# Patient Record
Sex: FEMALE | Race: WHITE | NOT HISPANIC OR LATINO
[De-identification: names, ages, dates, MRNs, and addresses within clinical notes are randomized per-mention and may not be internally consistent; named-entity substitution may affect disease eponyms.]

---

## 2018-08-24 ENCOUNTER — RESULT REVIEW (OUTPATIENT)
Age: 73
End: 2018-08-24

## 2019-06-06 PROBLEM — Z00.00 ENCOUNTER FOR PREVENTIVE HEALTH EXAMINATION: Status: ACTIVE | Noted: 2019-06-06

## 2019-09-04 ENCOUNTER — RECORD ABSTRACTING (OUTPATIENT)
Age: 74
End: 2019-09-04

## 2019-09-04 DIAGNOSIS — R42 DIZZINESS AND GIDDINESS: ICD-10-CM

## 2019-09-04 DIAGNOSIS — H04.123 DRY EYE SYNDROME OF BILATERAL LACRIMAL GLANDS: ICD-10-CM

## 2019-09-04 DIAGNOSIS — Z87.39 PERSONAL HISTORY OF OTHER DISEASES OF THE MUSCULOSKELETAL SYSTEM AND CONNECTIVE TISSUE: ICD-10-CM

## 2019-09-04 DIAGNOSIS — L72.9 FOLLICULAR CYST OF THE SKIN AND SUBCUTANEOUS TISSUE, UNSPECIFIED: ICD-10-CM

## 2019-09-04 LAB — CYTOLOGY CVX/VAG DOC THIN PREP: NORMAL

## 2019-09-12 ENCOUNTER — APPOINTMENT (OUTPATIENT)
Dept: OBGYN | Facility: CLINIC | Age: 74
End: 2019-09-12
Payer: MEDICARE

## 2019-09-12 VITALS
WEIGHT: 147 LBS | SYSTOLIC BLOOD PRESSURE: 128 MMHG | BODY MASS INDEX: 27.05 KG/M2 | HEIGHT: 62 IN | DIASTOLIC BLOOD PRESSURE: 72 MMHG

## 2019-09-12 DIAGNOSIS — Z92.89 PERSONAL HISTORY OF OTHER MEDICAL TREATMENT: ICD-10-CM

## 2019-09-12 DIAGNOSIS — Z87.898 PERSONAL HISTORY OF OTHER SPECIFIED CONDITIONS: ICD-10-CM

## 2019-09-12 DIAGNOSIS — R42 DIZZINESS AND GIDDINESS: ICD-10-CM

## 2019-09-12 DIAGNOSIS — Z78.9 OTHER SPECIFIED HEALTH STATUS: ICD-10-CM

## 2019-09-12 DIAGNOSIS — Z12.11 ENCOUNTER FOR SCREENING FOR MALIGNANT NEOPLASM OF COLON: ICD-10-CM

## 2019-09-12 DIAGNOSIS — N95.2 POSTMENOPAUSAL ATROPHIC VAGINITIS: ICD-10-CM

## 2019-09-12 DIAGNOSIS — K21.0 GASTRO-ESOPHAGEAL REFLUX DISEASE WITH ESOPHAGITIS: ICD-10-CM

## 2019-09-12 DIAGNOSIS — E06.3 AUTOIMMUNE THYROIDITIS: ICD-10-CM

## 2019-09-12 LAB
DATE COLLECTED: NORMAL
HEMOCCULT SP1 STL QL: NEGATIVE
QUALITY CONTROL: YES

## 2019-09-12 PROCEDURE — G0101: CPT

## 2019-09-12 PROCEDURE — 82270 OCCULT BLOOD FECES: CPT

## 2019-09-12 NOTE — PHYSICAL EXAM
[Awake] : awake [Alert] : alert [Examination Of The Breasts] : a normal appearance [No Discharge] : no discharge [No Masses] : no breast masses were palpable [Soft] : soft [Oriented x3] : oriented to person, place, and time [Labia Majora] : labia major [Labia Minora] : labia minora [Normal] : clitoris [Atrophy] : atrophy [No Bleeding] : there was no active vaginal bleeding [Dry Mucosa] : dry mucosa [Uterine Adnexae] : were not tender and not enlarged [No Tenderness] : no rectal tenderness [Nl Sphincter Tone] : normal sphincter tone [Acute Distress] : no acute distress [Mass] : no breast mass [Nipple Discharge] : no nipple discharge [Axillary LAD] : no axillary lymphadenopathy [Tender] : non tender [Distended] : not distended [H/Smegaly] : no hepatosplenomegaly [Depressed Mood] : not depressed [Flat Affect] : affect not flat [Occult Blood] : occult blood test from digital rectal exam was negative

## 2019-09-12 NOTE — END OF VISIT
[FreeTextEntry3] : I, Brian Luz, acted solely as a scribe for Dr. Koehler on this date 09/12/2019.\par All medical record entries made by the Scribe were at my, Dr. Koehler’s direction and personally dictated by me on  09/12/2019. I have reviewed the chart and agree that the record accurately reflects my personal performance of the history, physical exam, assessment and plan. I have also personally directed, reviewed, and agreed with the chart.\par \par  - Continue statin

## 2019-09-12 NOTE — HISTORY OF PRESENT ILLNESS
[1 Year Ago] : 1 year ago [Good] : being in good health [Healthy Diet] : a healthy diet [Regular Exercise] : regular exercise [Last Mammogram ___] : Last Mammogram was [unfilled] [Last Bone Density ___] : Last bone density studies [unfilled] [Last Pap ___] : Last cervical pap smear was [unfilled] [Last Colonoscopy ___] : Last colonoscopy [unfilled] [Postmenopausal] : is postmenopausal [Pregnancy History] : pregnancy history: [Total Preg ___] : [unfilled] [Full Term ___] : [unfilled] [Living ___] : [unfilled] [Monogamous (Male Partner)] : is monogamous with a male partner [Menarche Age: ____] : age at menarche was [unfilled] [Definite:  ___ (Date)] : the last menstrual period was [unfilled] [Sexually Active] : is sexually active [Male ___] : [unfilled] male [Monogamous] : is monogamous [Weight Concerns] : no concerns with her weight [de-identified] : p u/s 10/06/2014

## 2020-06-22 ENCOUNTER — RX RENEWAL (OUTPATIENT)
Age: 75
End: 2020-06-22

## 2020-10-14 ENCOUNTER — APPOINTMENT (OUTPATIENT)
Dept: OBGYN | Facility: CLINIC | Age: 75
End: 2020-10-14
Payer: MEDICARE

## 2020-10-14 VITALS
DIASTOLIC BLOOD PRESSURE: 60 MMHG | WEIGHT: 132 LBS | TEMPERATURE: 97.8 F | SYSTOLIC BLOOD PRESSURE: 120 MMHG | BODY MASS INDEX: 25.91 KG/M2 | HEIGHT: 60 IN

## 2020-10-14 DIAGNOSIS — Z78.0 ASYMPTOMATIC MENOPAUSAL STATE: ICD-10-CM

## 2020-10-14 DIAGNOSIS — Z12.39 ENCOUNTER FOR OTHER SCREENING FOR MALIGNANT NEOPLASM OF BREAST: ICD-10-CM

## 2020-10-14 DIAGNOSIS — Z01.419 ENCOUNTER FOR GYNECOLOGICAL EXAMINATION (GENERAL) (ROUTINE) W/OUT ABNORMAL FINDINGS: ICD-10-CM

## 2020-10-14 PROCEDURE — 99214 OFFICE O/P EST MOD 30 MIN: CPT

## 2020-10-14 RX ORDER — CYCLOSPORINE 0.5 MG/ML
0.05 EMULSION OPHTHALMIC
Refills: 0 | Status: ACTIVE | COMMUNITY

## 2020-10-14 RX ORDER — QUINIDINE GLUCONATE 324 MG
TABLET, EXTENDED RELEASE ORAL
Refills: 0 | Status: ACTIVE | COMMUNITY

## 2020-10-14 RX ORDER — COLD-HOT PACK
EACH MISCELLANEOUS
Refills: 0 | Status: ACTIVE | COMMUNITY

## 2020-10-14 RX ORDER — OMEGA-3/DHA/EPA/FISH OIL 300-1000MG
CAPSULE ORAL
Refills: 0 | Status: ACTIVE | COMMUNITY

## 2020-10-14 RX ORDER — ESTRADIOL 2 MG/1
2 RING VAGINAL
Qty: 1 | Refills: 3 | Status: ACTIVE | COMMUNITY
Start: 2019-09-12 | End: 1900-01-01

## 2020-10-14 RX ORDER — VITAMIN B COMPLEX
CAPSULE ORAL
Refills: 0 | Status: ACTIVE | COMMUNITY

## 2020-10-14 RX ORDER — OMEPRAZOLE 20 MG/1
20 TABLET, DELAYED RELEASE ORAL
Refills: 0 | Status: ACTIVE | COMMUNITY

## 2020-10-14 RX ORDER — PSYLLIUM HUSK 0.4 G
CAPSULE ORAL
Refills: 0 | Status: ACTIVE | COMMUNITY

## 2020-10-14 RX ORDER — ESTRADIOL 2 MG/1
2 RING VAGINAL
Refills: 0 | Status: ACTIVE | COMMUNITY

## 2020-10-14 RX ORDER — LEVOTHYROXINE SODIUM 50 UG/1
50 CAPSULE ORAL
Refills: 0 | Status: ACTIVE | COMMUNITY

## 2020-10-14 RX ORDER — CHROMIUM 200 MCG
TABLET ORAL
Refills: 0 | Status: ACTIVE | COMMUNITY

## 2020-10-14 RX ORDER — CICLESONIDE 50 UG/1
50 SPRAY NASAL
Refills: 0 | Status: ACTIVE | COMMUNITY

## 2020-10-14 NOTE — DISCUSSION/SUMMARY
[FreeTextEntry1] : 76 y/o\par gyn annual\par -ASCCP guidelines recommend cessation of pap smears\par -MMG script\par -refill [facility]\par rto 1 yr gyn annual or prn

## 2020-10-14 NOTE — HISTORY OF PRESENT ILLNESS
[Patient reported mammogram was normal] : Patient reported mammogram was normal [Patient reported PAP Smear was normal] : Patient reported PAP Smear was normal [LMP unknown] : LMP unknown [postmenopausal] : postmenopausal [Y] : Positive pregnancy history [unknown] : Patient is unsure of the date of her LMP [Menarche Age: ____] : age at menarche was [unfilled] [Currently Active] : currently active [Men] : men [Vaginal] : vaginal [No] : No [N] : Patient is not sexually active [FreeTextEntry1] : 76 y/o gyn annual.\par c/o of chronic urinary urgency in the morning only. denies dysuria/frequency or leakage of urine.\par denies hx of abnormal pap over the last 20 years.\par using [facility] would like a refill [Mammogramdate] : 05/10/19 [TextBox_19] : B1 [PapSmeardate] : 08/24/18 [ColonoscopyDate] : 06/17 [TextBox_31] : neg [[facility] 2 [PGHxTotal] : 2

## 2020-10-14 NOTE — PHYSICAL EXAM
[Alert] : alert [Appropriately responsive] : appropriately responsive [No Lymphadenopathy] : no lymphadenopathy [No Acute Distress] : no acute distress [No Murmurs] : no murmurs [Soft] : soft [Non-distended] : non-distended [Non-tender] : non-tender [No HSM] : No HSM [No Lesions] : no lesions [No Mass] : no mass [Examination Of The Breasts] : a normal appearance [Oriented x3] : oriented x3 [No Masses] : no breast masses were palpable [Labia Minora] : normal [Labia Majora] : normal [Atrophy] : atrophy [Normal] : normal [Uterine Adnexae] : normal

## 2020-12-23 PROBLEM — Z01.419 ENCOUNTER FOR ANNUAL ROUTINE GYNECOLOGICAL EXAMINATION: Status: RESOLVED | Noted: 2019-09-12 | Resolved: 2020-12-23

## 2021-03-15 ENCOUNTER — APPOINTMENT (OUTPATIENT)
Dept: ORTHOPEDIC SURGERY | Facility: CLINIC | Age: 76
End: 2021-03-15
Payer: MEDICARE

## 2021-03-15 VITALS — WEIGHT: 130 LBS | HEIGHT: 62 IN | BODY MASS INDEX: 23.92 KG/M2

## 2021-03-15 PROCEDURE — 73552 X-RAY EXAM OF FEMUR 2/>: CPT | Mod: RT

## 2021-03-15 PROCEDURE — 99203 OFFICE O/P NEW LOW 30 MIN: CPT

## 2021-03-15 RX ORDER — CHOLECALCIFEROL (VITAMIN D3) 25 MCG
TABLET ORAL
Refills: 0 | Status: ACTIVE | COMMUNITY

## 2021-03-15 RX ORDER — BIOTIN 5 MG
CAPSULE ORAL
Refills: 0 | Status: ACTIVE | COMMUNITY

## 2021-03-15 RX ORDER — OMEPRAZOLE 20 MG/1
20 CAPSULE, DELAYED RELEASE ORAL
Refills: 0 | Status: ACTIVE | COMMUNITY

## 2021-03-15 RX ORDER — UBIDECARENONE 200 MG
CAPSULE ORAL
Refills: 0 | Status: ACTIVE | COMMUNITY

## 2021-03-15 RX ORDER — DOCUSATE SODIUM 100 MG
100 TABLET ORAL
Refills: 0 | Status: ACTIVE | COMMUNITY

## 2021-03-15 RX ORDER — AMOXICILLIN 875 MG/1
875 TABLET, FILM COATED ORAL
Qty: 14 | Refills: 0 | Status: ACTIVE | COMMUNITY
Start: 2020-12-31

## 2021-03-15 RX ORDER — BACILLUS COAGULANS/INULIN 1B-250 MG
CAPSULE ORAL
Refills: 0 | Status: ACTIVE | COMMUNITY

## 2021-03-15 RX ORDER — TURMERIC ROOT EXTRACT 500 MG
TABLET ORAL
Refills: 0 | Status: ACTIVE | COMMUNITY

## 2021-03-15 RX ORDER — NAPROXEN 500 MG/1
500 TABLET ORAL
Qty: 60 | Refills: 0 | Status: ACTIVE | COMMUNITY
Start: 2021-02-15

## 2021-03-15 RX ORDER — TIZANIDINE 4 MG/1
4 TABLET ORAL
Qty: 90 | Refills: 0 | Status: ACTIVE | COMMUNITY
Start: 2021-02-15

## 2021-03-15 RX ORDER — METHYLPREDNISOLONE 4 MG/1
4 TABLET ORAL
Qty: 21 | Refills: 0 | Status: ACTIVE | COMMUNITY
Start: 2021-02-08

## 2021-03-15 RX ORDER — PRAMOXINE HYDROCHLORIDE HYDROCORTISONE ACETATE 100; 100 MG/10G; MG/10G
1-1 AEROSOL, FOAM TOPICAL
Qty: 10 | Refills: 0 | Status: ACTIVE | COMMUNITY
Start: 2020-09-24

## 2021-03-15 RX ORDER — CALCIUM CARBONATE/VITAMIN D3 600 MG-10
TABLET ORAL
Refills: 0 | Status: ACTIVE | COMMUNITY

## 2021-03-15 RX ORDER — ACETAMINOPHEN AND CODEINE 300; 30 MG/1; MG/1
300-30 TABLET ORAL
Qty: 42 | Refills: 0 | Status: ACTIVE | COMMUNITY
Start: 2021-03-07

## 2021-03-15 RX ORDER — UBIQUINOL 100 MG
CAPSULE ORAL
Refills: 0 | Status: ACTIVE | COMMUNITY

## 2021-03-15 NOTE — PHYSICAL EXAM
[de-identified] : Right Lower Extremity\par o Hip :\par ¦ Inspection/Palpation : no tenderness, no swelling, no deformities\par ¦ Range of Motion : : limited and painful in all planes,, no crepitus\par ¦ Stability : joint stability intact\par ¦ Strength : not assessed today due to fracture \par ¦ Tests and Signs : all tests for stability normal\par \par o Knee :\par ¦ Inspection/Palpation : no tenderness to palpation, no swelling, no deformity, incisions clean, dry and intact, nonerythematous, no discharge or dehiscence. \par ¦ Range of Motion : limited and painful in all planes, no crepitus\par ¦ Stability : no valgus or varus instability present on provocative testing, Lachman’s Test (-)\par ¦ Strength : not assessed today due to fracture \par o Muscle Bulk : normal muscle bulk present\par o Skin : no erythema, no ecchymosis\par o Sensation : sensation to pin intact\par o Vascular Exam : no edema, no cyanosis, dorsalis pedis artery pulse 2+, posterior tibial artery pulse 2+\par  [de-identified] : o Right Femur:  AP and lateral views were obtained, there are no soft tissue abnormalities, no fractures, alignment is normal, normal bone density, normal appearing joint spaces, no bony lesions, s/p intramedullary nailing and placement of a small medial plate on a distal femur fracture, hardware in good positioning and proper alignment. \par \par

## 2021-03-15 NOTE — HISTORY OF PRESENT ILLNESS
[de-identified] : SUSY MOSS is a 76 year female presenting to the office complaining of right knee pain. She presents to the office ambulating with a wheelchair. Patient sustained a fall while in [place] on 03/03/2021. She went to the hospital in [place] where she was diagnosed with a distal femur fracture. Patient underwent right distal femur fracture by Dr. Koo.  Patient’s incision was closed with subcuticular sutures and a derma bond. Patient reports mild diffuse pain since surgery. She has remained non-weight bearing since the procedure.  Patients physicians recommended follow up blood work, due to being on Lovenox and mildly abnormal blood count after surgery. Patient is taking Tylenol for pain relief with moderate relief in symptoms. Patient denies any other complaints at this time.

## 2021-03-15 NOTE — DISCUSSION/SUMMARY
[de-identified] : The underlying pathophysiology was reviewed in great detail with the patient as well as the various treatment options, including ice, analgesics, NSAIDs, Physical therapy, steroid injections.\par \par a prescription for lab work was provided today.\par \par Activity modifications and restrictions were discussed. remain PWB at this time \par \par FU 4 weeks for repeat xrays. \par \par All questions were answered, all alternatives discussed and the patient is in complete agreement with that plan. Follow-up appointment as instructed. Any issues and the patient will call or come in sooner.

## 2021-04-08 ENCOUNTER — APPOINTMENT (OUTPATIENT)
Dept: ORTHOPEDIC SURGERY | Facility: CLINIC | Age: 76
End: 2021-04-08
Payer: MEDICARE

## 2021-04-08 DIAGNOSIS — M72.2 PLANTAR FASCIAL FIBROMATOSIS: ICD-10-CM

## 2021-04-08 PROCEDURE — 99214 OFFICE O/P EST MOD 30 MIN: CPT

## 2021-04-08 PROCEDURE — 73552 X-RAY EXAM OF FEMUR 2/>: CPT | Mod: RT

## 2021-04-08 NOTE — HISTORY OF PRESENT ILLNESS
[de-identified] : SUSY MOSS is a 76 year female presenting to the office complaining of right knee pain. She presents to the office ambulating with a wheelchair. Patient sustained a fall while in [place] on 03/03/2021. She went to the hospital in [place] where she was diagnosed with a distal femur fracture. Patient underwent right distal femur fracture by Dr. Koo.  Patient’s incision was closed with subcuticular sutures and a derma bond. Patient reports mild diffuse pain since surgery. She has remained non-weight bearing since the procedure.  Patients physicians recommended follow up blood work, due to being on Lovenox and mildly abnormal blood count after surgery. Most recent CBC results were sent to her PCP, A prescription for Physical Therapy was provided.  Patient is taking Tylenol for pain relief with moderate relief in symptoms. \par Patient notes new onset of bilateral feet pain. Patient notes pain started in the left foot and is not located in the right foot. She notes increased pain with stepping off and the pain is worse in the morning. Patient denies any injury or trauma to the area. \par Patient denies any other complaints at this time.  \par

## 2021-04-08 NOTE — DISCUSSION/SUMMARY
[de-identified] : The underlying pathophysiology was reviewed in great detail with the patient as well as the various treatment options, including ice, analgesics, NSAIDs, Physical therapy, steroid injections.\par \par a prescription for lab work was provided today.\par \par A prescription for Physical Therapy was provided.\par \par Activity modifications and restrictions were discussed. remain PWB at this time \par \par FU 4 weeks for repeat xrays. \par \par All questions were answered, all alternatives discussed and the patient is in complete agreement with that plan. Follow-up appointment as instructed. Any issues and the patient will call or come in sooner.

## 2021-04-08 NOTE — PHYSICAL EXAM
[de-identified] : Right Lower Extremity\par o Hip :\par ¦ Inspection/Palpation : no tenderness, no swelling, no deformities\par ¦ Range of Motion : : limited and painful in all planes,, no crepitus\par ¦ Stability : joint stability intact\par ¦ Strength : not assessed today due to fracture \par ¦ Tests and Signs : all tests for stability normal\par \par o Knee :\par ¦ Inspection/Palpation : no tenderness to palpation, no swelling, no deformity, incisions clean, dry and intact, nonerythematous, no discharge or dehiscence. \par ¦ Range of Motion : 0-100, no crepitus\par ¦ Stability : no valgus or varus instability present on provocative testing, Lachman’s Test (-)\par ¦ Strength : not assessed today due to fracture \par o Muscle Bulk : normal muscle bulk present\par o Skin : no erythema, no ecchymosis\par o Sensation : sensation to pin intact\par o Vascular Exam : no edema, no cyanosis, dorsalis pedis artery pulse 2+, posterior tibial artery pulse 2+\par \par \par Right Lower Extremity\par o Foot:\par ¦ Inspection/Palpation : plantar fascia tenderness to palpation, no swelling\par ¦ Range of Motion : arc of motion full and painless in all planes\par ¦ Stability : no joint instability on provocative testing\par ¦ Strength : all muscles 5/5\par o Muscle Bulk : no atrophy\par o Sensation : sensation intact to light touch\par o Skin : no skin lesions, mild erythema over plantar medial aspect of the hind foot\par o Vascular Exam : no edema, no cyanosis, dorsalis pedis and posterior tibial pulses normal \par \par Left Lower Extremity\par o Foot:\par ¦ Inspection/Palpation : minimal plantar fascia tenderness to palpation, no swelling\par ¦ Range of Motion : arc of motion full and painless in all planes\par ¦ Stability : no joint instability on provocative testing\par ¦ Strength : all muscles 5/5\par o Muscle Bulk : no atrophy\par o Sensation : sensation intact to light touch\par o Skin : no skin lesions, no discoloration\par o Vascular Exam : no edema, no cyanosis, dorsalis pedis and posterior tibial pulses normal \par  [de-identified] : o Right Femur:  AP and lateral views were obtained, there are no soft tissue abnormalities, no fractures, alignment is normal, normal bone density, normal appearing joint spaces, no bony lesions, s/p intramedullary nailing and placement of a small medial plate on a distal femur fracture, hardware in good positioning and proper alignment. mild callus formation present. \par \par

## 2021-04-22 RX ORDER — OMEPRAZOLE 20 MG/1
20 CAPSULE, DELAYED RELEASE ORAL
Qty: 90 | Refills: 0 | Status: DISCONTINUED | COMMUNITY
Start: 2020-12-23 | End: 2021-04-22

## 2021-04-22 RX ORDER — ZINC SULFATE 50(220)MG
CAPSULE ORAL
Refills: 0 | Status: DISCONTINUED | COMMUNITY
End: 2021-04-22

## 2021-04-22 RX ORDER — OMEGA-3/DHA/EPA/FISH OIL 300-1000MG
CAPSULE ORAL
Refills: 0 | Status: DISCONTINUED | COMMUNITY
End: 2021-04-22

## 2021-04-22 RX ORDER — ENOXAPARIN SODIUM 100 MG/ML
40 INJECTION SUBCUTANEOUS
Qty: 8 | Refills: 0 | Status: DISCONTINUED | COMMUNITY
Start: 2021-03-07 | End: 2021-04-22

## 2021-04-22 RX ORDER — UBIDECARENONE/VIT E ACET 100MG-5
CAPSULE ORAL
Refills: 0 | Status: DISCONTINUED | COMMUNITY
End: 2021-04-22

## 2021-04-23 ENCOUNTER — OUTPATIENT (OUTPATIENT)
Dept: OUTPATIENT SERVICES | Facility: HOSPITAL | Age: 76
LOS: 1 days | End: 2021-04-23
Payer: MEDICARE

## 2021-04-23 ENCOUNTER — APPOINTMENT (OUTPATIENT)
Dept: ULTRASOUND IMAGING | Facility: CLINIC | Age: 76
End: 2021-04-23
Payer: MEDICARE

## 2021-04-23 DIAGNOSIS — S72.491A OTHER FRACTURE OF LOWER END OF RIGHT FEMUR, INITIAL ENCOUNTER FOR CLOSED FRACTURE: ICD-10-CM

## 2021-04-23 PROCEDURE — 93971 EXTREMITY STUDY: CPT | Mod: 26,RT

## 2021-04-23 PROCEDURE — 93971 EXTREMITY STUDY: CPT

## 2021-05-10 ENCOUNTER — APPOINTMENT (OUTPATIENT)
Dept: ORTHOPEDIC SURGERY | Facility: CLINIC | Age: 76
End: 2021-05-10
Payer: MEDICARE

## 2021-05-10 ENCOUNTER — NON-APPOINTMENT (OUTPATIENT)
Age: 76
End: 2021-05-10

## 2021-05-10 LAB
BASOPHILS # BLD AUTO: 0.02 K/UL
BASOPHILS NFR BLD AUTO: 0.4 %
EOSINOPHIL # BLD AUTO: 0.09 K/UL
EOSINOPHIL NFR BLD AUTO: 1.6 %
HCT VFR BLD CALC: 39.3 %
HGB BLD-MCNC: 12.5 G/DL
IMM GRANULOCYTES NFR BLD AUTO: 0.5 %
LYMPHOCYTES # BLD AUTO: 0.88 K/UL
LYMPHOCYTES NFR BLD AUTO: 15.5 %
MAN DIFF?: NORMAL
MCHC RBC-ENTMCNC: 29.3 PG
MCHC RBC-ENTMCNC: 31.8 GM/DL
MCV RBC AUTO: 92 FL
MONOCYTES # BLD AUTO: 0.46 K/UL
MONOCYTES NFR BLD AUTO: 8.1 %
NEUTROPHILS # BLD AUTO: 4.19 K/UL
NEUTROPHILS NFR BLD AUTO: 73.9 %
PLATELET # BLD AUTO: 250 K/UL
RBC # BLD: 4.27 M/UL
RBC # FLD: 12.9 %
WBC # FLD AUTO: 5.67 K/UL

## 2021-05-10 PROCEDURE — 99213 OFFICE O/P EST LOW 20 MIN: CPT

## 2021-05-10 PROCEDURE — 73552 X-RAY EXAM OF FEMUR 2/>: CPT | Mod: RT

## 2021-05-10 NOTE — DISCUSSION/SUMMARY
[de-identified] : The underlying pathophysiology was reviewed in great detail with the patient as well as the various treatment options, including ice, analgesics, NSAIDs, Physical therapy, steroid injections.\par \par \par Activity modifications and restrictions were discussed. \par A prescription for Physical Therapy was provided. Patient may progress to 50% WBAT. \par \par Complete CBC blood work. Will send lab results to PCP. \par \par FU 4 weeks for repeat xrays. \par \par All questions were answered, all alternatives discussed and the patient is in complete agreement with that plan. Follow-up appointment as instructed. Any issues and the patient will call or come in sooner.

## 2021-05-10 NOTE — PHYSICAL EXAM
[de-identified] : Right Lower Extremity\par o Hip :\par ¦ Inspection/Palpation : no tenderness, no swelling, no deformities\par ¦ Range of Motion : : limited and painful in all planes,, no crepitus\par ¦ Stability : joint stability intact\par ¦ Strength : not assessed today due to fracture \par ¦ Tests and Signs : all tests for stability normal\par \par o Knee :\par ¦ Inspection/Palpation : no tenderness to palpation, no swelling, no deformity, incisions clean, dry and intact, nonerythematous, no discharge or dehiscence. \par ¦ Range of Motion : 0-100, no crepitus\par ¦ Stability : no valgus or varus instability present on provocative testing, Lachman’s Test (-)\par ¦ Strength : not assessed today due to fracture \par o Muscle Bulk : normal muscle bulk present\par o Skin : no erythema, no ecchymosis\par o Sensation : sensation to pin intact\par o Vascular Exam : no edema, no cyanosis, dorsalis pedis artery pulse 2+, posterior tibial artery pulse 2+\par \par \par Right Lower Extremity\par o Foot:\par ¦ Inspection/Palpation : plantar fascia tenderness to palpation, no swelling\par ¦ Range of Motion : arc of motion full and painless in all planes\par ¦ Stability : no joint instability on provocative testing\par ¦ Strength : all muscles 5/5\par o Muscle Bulk : no atrophy\par o Sensation : sensation intact to light touch\par o Skin : no skin lesions, mild erythema over plantar medial aspect of the hind foot\par o Vascular Exam : no edema, no cyanosis, dorsalis pedis and posterior tibial pulses normal \par \par Left Lower Extremity\par o Foot:\par ¦ Inspection/Palpation : minimal plantar fascia tenderness to palpation, no swelling\par ¦ Range of Motion : arc of motion full and painless in all planes\par ¦ Stability : no joint instability on provocative testing\par ¦ Strength : all muscles 5/5\par o Muscle Bulk : no atrophy\par o Sensation : sensation intact to light touch\par o Skin : no skin lesions, no discoloration\par o Vascular Exam : no edema, no cyanosis, dorsalis pedis and posterior tibial pulses normal \par  [de-identified] : o Right Femur:  AP and lateral views were obtained, there are no soft tissue abnormalities, no fractures, alignment is normal, normal bone density, normal appearing joint spaces, no bony lesions, s/p intramedullary nailing and placement of a small medial plate on a distal femur fracture, hardware in good positioning and proper alignment. mild callus formation present. \par \par

## 2021-05-10 NOTE — HISTORY OF PRESENT ILLNESS
[de-identified] : SUSY MOSS is a 76 year female presenting to the office complaining of right knee pain. She presents to the office ambulating with a wheelchair. Patient sustained a fall while in [place] on 03/03/2021. She went to the hospital in [place] where she was diagnosed with a distal femur fracture. Patient underwent right distal femur fracture by Dr. Koo.  Patient’s incision was closed with subcuticular sutures and a derma bond. Patient reports mild diffuse pain since surgery. She has remained non-weight bearing since the procedure.  Patients physicians recommended follow up blood work, due to being on Lovenox and mildly abnormal blood count after surgery. Most recent CBC results were sent to her PCP, A prescription for Physical Therapy was provided.  Patient is taking Tylenol for pain relief with moderate relief in symptoms. \par Patient notes new onset of bilateral feet pain. Patient notes pain started in the left foot and is not located in the right foot. She notes increased pain with stepping off and the pain is worse in the morning.  She called last week with lower leg swelling. She was sent for a duplex US that was negative for clots. She was referred to a vascular physician for further evaluation. Patient denies any injury or trauma to the area.  \par Patient denies any other complaints at this time.  \par

## 2021-06-07 ENCOUNTER — APPOINTMENT (OUTPATIENT)
Dept: ORTHOPEDIC SURGERY | Facility: CLINIC | Age: 76
End: 2021-06-07
Payer: MEDICARE

## 2021-06-07 PROCEDURE — 73552 X-RAY EXAM OF FEMUR 2/>: CPT | Mod: RT

## 2021-06-07 PROCEDURE — 99213 OFFICE O/P EST LOW 20 MIN: CPT

## 2021-06-08 NOTE — HISTORY OF PRESENT ILLNESS
[de-identified] : SUSY MOSS is a 76 year female presenting to the office complaining of right knee pain. She presents to the office ambulating with a wheelchair. Patient sustained a fall while in [place] on 03/03/2021. She went to the hospital in [place] where she was diagnosed with a distal femur fracture. Patient underwent right distal femur fracture by Dr. Koo.  Patient’s incision was closed with subcuticular sutures and a derma bond. Patient reports mild diffuse pain since surgery. She has remained non-weight bearing since the procedure.  Patients physicians recommended follow up blood work, due to being on Lovenox and mildly abnormal blood count after surgery. Most recent CBC results were sent to her PCP, A prescription for Physical Therapy was provided.  Patient is taking Tylenol for pain relief with moderate relief in symptoms. \par Patient notes new onset of bilateral feet pain. Patient notes pain started in the left foot and is not located in the right foot. She notes increased pain with stepping off and the pain is worse in the morning.  She called last week with lower leg swelling. She was sent for a duplex US that was negative for clots. She was referred to a vascular physician for further evaluation. Patient denies any injury or trauma to the area.  \par Patient denies any other complaints at this time.  \par

## 2021-06-08 NOTE — PHYSICAL EXAM
[de-identified] : Right Lower Extremity\par o Hip :\par ¦ Inspection/Palpation : no tenderness, no swelling, no deformities\par ¦ Range of Motion : : limited and painful in all planes,, no crepitus\par ¦ Stability : joint stability intact\par ¦ Strength : not assessed today due to fracture \par ¦ Tests and Signs : all tests for stability normal\par \par o Knee :\par ¦ Inspection/Palpation : no tenderness to palpation, no swelling, no deformity, incisions clean, dry and intact, nonerythematous, no discharge or dehiscence. \par ¦ Range of Motion : 0-100, no crepitus\par ¦ Stability : no valgus or varus instability present on provocative testing, Lachman’s Test (-)\par ¦ Strength : not assessed today due to fracture \par o Muscle Bulk : normal muscle bulk present\par o Skin : no erythema, no ecchymosis\par o Sensation : sensation to pin intact\par o Vascular Exam : no edema, no cyanosis, dorsalis pedis artery pulse 2+, posterior tibial artery pulse 2+\par \par \par Right Lower Extremity\par o Foot:\par ¦ Inspection/Palpation : plantar fascia tenderness to palpation, no swelling\par ¦ Range of Motion : arc of motion full and painless in all planes\par ¦ Stability : no joint instability on provocative testing\par ¦ Strength : all muscles 5/5\par o Muscle Bulk : no atrophy\par o Sensation : sensation intact to light touch\par o Skin : no skin lesions, mild erythema over plantar medial aspect of the hind foot\par o Vascular Exam : no edema, no cyanosis, dorsalis pedis and posterior tibial pulses normal \par \par Left Lower Extremity\par o Foot:\par ¦ Inspection/Palpation : minimal plantar fascia tenderness to palpation, no swelling\par ¦ Range of Motion : arc of motion full and painless in all planes\par ¦ Stability : no joint instability on provocative testing\par ¦ Strength : all muscles 5/5\par o Muscle Bulk : no atrophy\par o Sensation : sensation intact to light touch\par o Skin : no skin lesions, no discoloration\par o Vascular Exam : no edema, no cyanosis, dorsalis pedis and posterior tibial pulses normal \par  [de-identified] : o Right Femur:  AP and lateral views were obtained, there are no soft tissue abnormalities, no fractures, alignment is normal, normal bone density, normal appearing joint spaces, no bony lesions, s/p intramedullary nailing and placement of a small medial plate on a distal femur fracture, hardware in good positioning and proper alignment. mild callus formation present. \par \par

## 2021-07-12 ENCOUNTER — APPOINTMENT (OUTPATIENT)
Dept: ORTHOPEDIC SURGERY | Facility: CLINIC | Age: 76
End: 2021-07-12
Payer: MEDICARE

## 2021-07-12 PROCEDURE — 99213 OFFICE O/P EST LOW 20 MIN: CPT

## 2021-07-12 PROCEDURE — 73552 X-RAY EXAM OF FEMUR 2/>: CPT | Mod: RT

## 2021-07-12 NOTE — REASON FOR VISIT
[Other: ____] : [unfilled] [Follow-Up Visit] : a follow-up visit for [Aftercare Following Surgery] : aftercare following surgery

## 2021-07-13 NOTE — HISTORY OF PRESENT ILLNESS
[de-identified] : SUSY MOSS is a 76 year female presenting to the office complaining of right knee pain. She presents to the office ambulating with a cane today Patient sustained a fall while in [place] on 03/03/2021. She went to the hospital in [place] where she was diagnosed with a distal femur fracture. Patient underwent right distal femur fracture by Dr. Koo.  Patient’s incision was closed with subcuticular sutures and a derma bond. \par Since last visit, patient reports feeling better overall. She is able to ambulate with a cane with minimal difficulties. She  has been attending physical therapy noting improvements in strength and range of motion. She notes some intermittent pain and soreness in the hip that is increased with prolonged walking, standing or with stairs.  Patient is taking Tylenol for pain relief with moderate relief in symptoms. \par Patient notes new onset of bilateral feet pain. Patient notes pain started in the left foot and is not located in the right foot. She notes increased pain with stepping off and the pain is worse in the morning.  She called last week with lower leg swelling. She was sent for a duplex US that was negative for clots. She was referred to a vascular physician for further evaluation. Patient denies any injury or trauma to the area.  \par Patient denies any other complaints at this time.  \par

## 2021-07-13 NOTE — PHYSICAL EXAM
[de-identified] : Right Lower Extremity\par o Hip :\par ¦ Inspection/Palpation : no tenderness, no swelling, no deformities\par ¦ Range of Motion : : mildly restricted and painfree in all planes, no crepitus\par ¦ Stability : joint stability intact\par ¦ Strength : not assessed today due to fracture \par ¦ Tests and Signs : all tests for stability normal\par \par o Knee :\par ¦ Inspection/Palpation : no tenderness to palpation, no swelling, no deformity, incision well healed. \par ¦ Range of Motion : 0-110, no crepitus\par ¦ Stability : no valgus or varus instability present on provocative testing, Lachman’s Test (-)\par ¦ Strength : not assessed today due to fracture \par o Muscle Bulk : normal muscle bulk present\par o Skin : no erythema, no ecchymosis\par o Sensation : sensation to pin intact\par o Vascular Exam : no edema, no cyanosis, dorsalis pedis artery pulse 2+, posterior tibial artery pulse 2+\par \par \par Right Lower Extremity\par o Foot:\par ¦ Inspection/Palpation : mild plantar fascia tenderness to palpation, no swelling\par ¦ Range of Motion : arc of motion full and painless in all planes\par ¦ Stability : no joint instability on provocative testing\par ¦ Strength : all muscles 5/5\par o Muscle Bulk : no atrophy\par o Sensation : sensation intact to light touch\par o Skin : no skin lesions, mild erythema over plantar medial aspect of the hind foot\par o Vascular Exam : no edema, no cyanosis, dorsalis pedis and posterior tibial pulses normal \par \par Left Lower Extremity\par o Foot:\par ¦ Inspection/Palpation : minimal plantar fascia tenderness to palpation, no swelling\par ¦ Range of Motion : arc of motion full and painless in all planes\par ¦ Stability : no joint instability on provocative testing\par ¦ Strength : all muscles 5/5\par o Muscle Bulk : no atrophy\par o Sensation : sensation intact to light touch\par o Skin : no skin lesions, no discoloration\par o Vascular Exam : no edema, no cyanosis, dorsalis pedis and posterior tibial pulses normal \par  [de-identified] : o Right Femur:  AP and lateral views were obtained, there are no soft tissue abnormalities, no fractures, alignment is normal, normal bone density, normal appearing joint spaces, no bony lesions, s/p intramedullary nailing and placement of a small medial plate on a distal femur fracture, hardware in good positioning and proper alignment. increased callus formation present. \par \par

## 2021-07-13 NOTE — DISCUSSION/SUMMARY
[de-identified] : The underlying pathophysiology was reviewed in great detail with the patient as well as the various treatment options, including ice, analgesics, NSAIDs, Physical therapy, steroid injections.\par \par Activity modifications and restrictions were discussed. \par \par Continue physical therapy as prescribed. A prescription for Physical Therapy was provided. \par \par FU 8 weeks for repeat xrays. \par \par All questions were answered, all alternatives discussed and the patient is in complete agreement with that plan. Follow-up appointment as instructed. Any issues and the patient will call or come in sooner.

## 2021-09-13 ENCOUNTER — APPOINTMENT (OUTPATIENT)
Dept: ORTHOPEDIC SURGERY | Facility: CLINIC | Age: 76
End: 2021-09-13
Payer: MEDICARE

## 2021-09-13 DIAGNOSIS — S72.491G: ICD-10-CM

## 2021-09-13 PROCEDURE — 20610 DRAIN/INJ JOINT/BURSA W/O US: CPT | Mod: RT

## 2021-09-13 PROCEDURE — 99213 OFFICE O/P EST LOW 20 MIN: CPT | Mod: 25

## 2021-09-13 PROCEDURE — 73564 X-RAY EXAM KNEE 4 OR MORE: CPT | Mod: RT

## 2021-09-13 PROCEDURE — 73552 X-RAY EXAM OF FEMUR 2/>: CPT | Mod: RT

## 2021-09-13 NOTE — PROCEDURE
[de-identified] : At this point I recommended a therapeutic injection and under sterile precautions an injection of 4 cc 1% lidocaine with 0.5 cc of Kenalog and 0.5 cc of Dexamethasone - was placed into the joint of the RIGHT knee without complication, and after several minutes, the patient felt significant relief.\par \par

## 2021-09-13 NOTE — PHYSICAL EXAM
[de-identified] : Right Lower Extremity\par o Hip :\par ¦ Inspection/Palpation : no tenderness, no swelling, no deformities\par ¦ Range of Motion : : mildly restricted and pain free in all planes, no crepitus\par ¦ Stability : joint stability intact\par ¦ Strength : not assessed today due to fracture \par ¦ Tests and Signs : all tests for stability normal\par \par o Knee :\par ¦ Inspection/Palpation : marked medial joint line tenderness to palpation, no swelling, no deformity, incision well healed. \par ¦ Range of Motion : 0-110, no crepitus\par ¦ Stability : no valgus or varus instability present on provocative testing, Lachman’s Test (-)\par ¦ Strength : 3+/5 hip flexion  \par o Muscle Bulk : normal muscle bulk present\par o Skin : no erythema, no ecchymosis\par o Sensation : sensation to pin intact\par o Vascular Exam : no edema, no cyanosis, dorsalis pedis artery pulse 2+, posterior tibial artery pulse 2+\par  [de-identified] : o Right Femur:  AP and lateral views were obtained, there are no soft tissue abnormalities, no fractures, alignment is normal, normal bone density, , no bony lesions, s/p intramedullary nailing and placement of a small medial plate on a distal femur fracture, hardware in good positioning and proper alignment. increased callus formation present. 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

## 2021-09-13 NOTE — DISCUSSION/SUMMARY
[de-identified] : The underlying pathophysiology was reviewed in great detail with the patient as well as the various treatment options, including ice, analgesics, NSAIDs, Physical therapy, steroid injections.\par \par Patient received a corticosteroid injection of the right knee today. \par \par Activity modifications and restrictions were discussed. \par \par . A prescription for Physical Therapy was provided. \par \par A home exercise sheet was given and discussed with the patient to follow. \par \par FU 8 weeks for repeat xrays. \par \par All questions were answered, all alternatives discussed and the patient is in complete agreement with that plan. Follow-up appointment as instructed. Any issues and the patient will call or come in sooner.

## 2021-09-13 NOTE — HISTORY OF PRESENT ILLNESS
[de-identified] : SUSY MOSS is a 76 year female presenting to the office complaining of right knee pain. She presents to the office ambulating with a cane today Patient sustained a fall while in [place] on 03/03/2021. She went to the hospital in [place] where she was diagnosed with a distal femur fracture. Patient underwent right distal femur fracture by Dr. Koo.  Patient’s incision was closed with subcuticular sutures and a derma bond. \par Since last visit, patient reports regression.  She is able to ambulate with a cane with minimal difficulties. She discontinued formal physcial therapy ion July due to lack of insurance coverage. She was not as diligent with a home exercises program as she should of been. She notes increased knee pain over the past two months. Pain is diffusely located. . She notes some intermittent pain and soreness in the hip that is increased with prolonged walking, standing or with stairs.  Patient is taking Tylenol for pain relief with moderate relief in symptoms. \par Patient denies any other complaints at this time.  \par

## 2021-11-15 ENCOUNTER — APPOINTMENT (OUTPATIENT)
Dept: ORTHOPEDIC SURGERY | Facility: CLINIC | Age: 76
End: 2021-11-15
Payer: MEDICARE

## 2021-11-15 DIAGNOSIS — S72.491D OTHER FRACTURE OF LOWER END OF RIGHT FEMUR, SUBSEQUENT ENCOUNTER FOR CLOSED FRACTURE WITH ROUTINE HEALING: ICD-10-CM

## 2021-11-15 DIAGNOSIS — M17.11 UNILATERAL PRIMARY OSTEOARTHRITIS, RIGHT KNEE: ICD-10-CM

## 2021-11-15 PROCEDURE — 99214 OFFICE O/P EST MOD 30 MIN: CPT | Mod: 25

## 2021-11-15 PROCEDURE — 20610 DRAIN/INJ JOINT/BURSA W/O US: CPT | Mod: RT

## 2021-11-15 PROCEDURE — 73552 X-RAY EXAM OF FEMUR 2/>: CPT | Mod: RT

## 2021-11-15 NOTE — HISTORY OF PRESENT ILLNESS
[de-identified] : SUSY MOSS is a 76 year female presenting to the office complaining of right knee pain. She presents to the office ambulating with a cane today Patient sustained a fall while in [place] on 03/03/2021. She went to the hospital in [place] where she was diagnosed with a distal femur fracture. Patient underwent right distal femur fracture by Dr. Koo.  Patient’s incision was closed with subcuticular sutures and a derma bond. \par Since last visit, patient reports regression.  She is able to ambulate with a cane with minimal difficulties. She discontinued formal Physcial therapy ion July due to lack of insurance coverage. She was not as diligent with a home exercises program as she should of been. She notes increased knee pain over the past two months. Pain is diffusely located. . She notes some intermittent pain and soreness in the hip that is increased with prolonged walking, standing or with stairs.  Patient is taking Tylenol for pain relief with moderate relief in symptoms. Patient denies any other complaints at this time.

## 2021-11-15 NOTE — PROCEDURE
[de-identified] : At this point I recommended a therapeutic injection and under sterile precautions an injection of 4 cc of Monovisc (Lot: [id number] , Expiration: 03/2023 ), was placed into the joint of the RIGHT knee without complication\par \par \par

## 2021-11-15 NOTE — DISCUSSION/SUMMARY
[de-identified] : The underlying pathophysiology was reviewed in great detail with the patient as well as the various treatment options, including ice, analgesics, NSAIDs, Physical therapy, steroid injections.\par \par The patient elected to receive a Monovisc injection into her right  knee today and tolerated it well. I instructed the patient on ROM exercises, and told them to take it easy. The use of ice and rest was reviewed with the patient. The patient may resume activities in a couple of days. I reminded the patient that it takes 4 to 6 weeks after the Monovisc injection to feel symptom relief \par  \par \par Activity modifications and restrictions were discussed. \par \par . A prescription for Physical Therapy was provided. \par \par A home exercise sheet was given and discussed with the patient to follow. \par \par She may return in 6 months for another series of gel injections as per insurance guidelines. A cortisone injection may be administered during the interim period if she cannot tolerate such a wait. \par \par Discussed she may return in January prior to going to [place] for a corticosteroid injection if pain persists. \par \par All questions were answered, all alternatives discussed and the patient is in complete agreement with that plan. Follow-up appointment as instructed. Any issues and the patient will call or come in sooner.

## 2021-11-15 NOTE — PHYSICAL EXAM
[de-identified] : Right Lower Extremity\par o Hip :\par ¦ Inspection/Palpation : no tenderness, no swelling, no deformities\par ¦ Range of Motion : : mildly restricted and pain free in all planes, no crepitus\par ¦ Stability : joint stability intact\par ¦ Strength : not assessed today due to fracture \par ¦ Tests and Signs : all tests for stability normal\par \par o Knee :\par ¦ Inspection/Palpation : mild medial joint line tenderness to palpation, no swelling, no deformity, incision well healed. \par ¦ Range of Motion : 0-110, no crepitus\par ¦ Stability : no valgus or varus instability present on provocative testing, Lachman’s Test (-)\par ¦ Strength : 4/5 hip flexion  \par o Muscle Bulk : normal muscle bulk present\par o Skin : no erythema, no ecchymosis\par o Sensation : sensation to pin intact\par o Vascular Exam : no edema, no cyanosis, dorsalis pedis artery pulse 2+, posterior tibial artery pulse 2+\par  [de-identified] : o Right Femur:  AP and lateral views were obtained, there are no soft tissue abnormalities, no fractures, alignment is normal, normal bone density, , no bony lesions, s/p intramedullary nailing and placement of a small medial plate on a distal femur fracture, hardware in good positioning and proper alignment, well healed, moderate to severe tricompartmental osteoarthritis with bone on bone apposition of the medial compartment\par \par

## 2023-04-24 ENCOUNTER — OFFICE (OUTPATIENT)
Dept: URBAN - METROPOLITAN AREA CLINIC 12 | Facility: CLINIC | Age: 78
Setting detail: OPHTHALMOLOGY
End: 2023-04-24
Payer: MEDICARE

## 2023-04-24 DIAGNOSIS — H35.30: ICD-10-CM

## 2023-04-24 DIAGNOSIS — H35.373: ICD-10-CM

## 2023-04-24 DIAGNOSIS — H26.492: ICD-10-CM

## 2023-04-24 DIAGNOSIS — H16.223: ICD-10-CM

## 2023-04-24 DIAGNOSIS — H43.393: ICD-10-CM

## 2023-04-24 PROCEDURE — 92134 CPTRZ OPH DX IMG PST SGM RTA: CPT | Performed by: STUDENT IN AN ORGANIZED HEALTH CARE EDUCATION/TRAINING PROGRAM

## 2023-04-24 PROCEDURE — 68761 CLOSE TEAR DUCT OPENING: CPT | Performed by: STUDENT IN AN ORGANIZED HEALTH CARE EDUCATION/TRAINING PROGRAM

## 2023-04-24 PROCEDURE — 92014 COMPRE OPH EXAM EST PT 1/>: CPT | Performed by: STUDENT IN AN ORGANIZED HEALTH CARE EDUCATION/TRAINING PROGRAM

## 2023-04-24 ASSESSMENT — KERATOMETRY
METHOD_AUTO_MANUAL: AUTO
OD_AXISANGLE_DEGREES: 012
OD_K2POWER_DIOPTERS: 45.50
OD_K1POWER_DIOPTERS: 45.00
OS_K1POWER_DIOPTERS: 45.75
OS_K2POWER_DIOPTERS: 46.75
OS_AXISANGLE_DEGREES: 156

## 2023-04-24 ASSESSMENT — REFRACTION_MANIFEST
OD_SPHERE: +1.25
OD_VA1: 20/20
OS_VA1: 20/25
OS_VA1: 20/25+1
OD_CYLINDER: -1.00
OD_AXIS: 100
OS_AXIS: 085
OS_CYLINDER: -1.50
OD_AXIS: 100
OS_CYLINDER: -1.25
OS_SPHERE: +1.00
OS_AXIS: 80
OD_SPHERE: +1.50
OD_VA1: 20/20-2
OD_CYLINDER: -2.00
OD_ADD: +2.50
OS_ADD: +2.50
OS_SPHERE: +1.00

## 2023-04-24 ASSESSMENT — REFRACTION_AUTOREFRACTION
OD_CYLINDER: -1.25
OS_AXIS: 082
OD_SPHERE: +1.00
OD_AXIS: 098
OS_CYLINDER: -1.00
OS_SPHERE: +0.75

## 2023-04-24 ASSESSMENT — REFRACTION_CURRENTRX
OS_VPRISM_DIRECTION: PROGS
OD_ADD: +2.50
OS_CYLINDER: -1.25
OD_OVR_VA: 20/
OD_AXIS: 102
OS_AXIS: 091
OD_VPRISM_DIRECTION: PROGS
OS_ADD: +2.50
OD_CYLINDER: -1.00
OD_ADD: +2.25
OS_SPHERE: +1.00
OS_CYLINDER: -1.25
OD_SPHERE: +1.25
OD_OVR_VA: 20/
OS_SPHERE: +1.00
OS_VPRISM_DIRECTION: PROGS
OD_SPHERE: +1.25
OS_OVR_VA: 20/
OS_ADD: +2.25
OS_AXIS: 090
OD_AXIS: 101
OD_CYLINDER: -1.50
OS_OVR_VA: 20/
OD_VPRISM_DIRECTION: PROGS

## 2023-04-24 ASSESSMENT — SPHEQUIV_DERIVED
OS_SPHEQUIV: 0.375
OD_SPHEQUIV: 0.5
OD_SPHEQUIV: 0.375
OD_SPHEQUIV: 0.75
OS_SPHEQUIV: 0.25
OS_SPHEQUIV: 0.25

## 2023-04-24 ASSESSMENT — AXIALLENGTH_DERIVED
OS_AL: 22.5341
OD_AL: 22.6926
OS_AL: 22.5341
OD_AL: 22.8285
OS_AL: 22.4897
OD_AL: 22.783

## 2023-04-24 ASSESSMENT — SUPERFICIAL PUNCTATE KERATITIS (SPK)
OS_SPK: 1+
OD_SPK: 1+

## 2023-04-24 ASSESSMENT — PUNCTA - ASSESSMENT
OD_PUNCTA: SIL PLUG
OS_PUNCTA: SIL PLUG

## 2023-04-24 ASSESSMENT — TONOMETRY
OS_IOP_MMHG: 15
OD_IOP_MMHG: 15

## 2023-04-24 ASSESSMENT — CONFRONTATIONAL VISUAL FIELD TEST (CVF)
OD_FINDINGS: FULL
OS_FINDINGS: FULL

## 2023-04-24 ASSESSMENT — VISUAL ACUITY
OD_BCVA: 20/25+2
OS_BCVA: 20/25+2

## 2023-06-06 PROBLEM — H52.03 HYPEROPIA ; BOTH EYES: Status: ACTIVE | Noted: 2023-05-02

## 2023-10-25 ENCOUNTER — OFFICE (OUTPATIENT)
Dept: URBAN - METROPOLITAN AREA CLINIC 12 | Facility: CLINIC | Age: 78
Setting detail: OPHTHALMOLOGY
End: 2023-10-25
Payer: MEDICARE

## 2023-10-25 DIAGNOSIS — H35.3131: ICD-10-CM

## 2023-10-25 DIAGNOSIS — H43.393: ICD-10-CM

## 2023-10-25 DIAGNOSIS — H16.223: ICD-10-CM

## 2023-10-25 DIAGNOSIS — H26.492: ICD-10-CM

## 2023-10-25 PROCEDURE — 92014 COMPRE OPH EXAM EST PT 1/>: CPT | Performed by: OPHTHALMOLOGY

## 2023-10-25 PROCEDURE — 92250 FUNDUS PHOTOGRAPHY W/I&R: CPT | Performed by: OPHTHALMOLOGY

## 2023-10-25 ASSESSMENT — REFRACTION_MANIFEST
OD_AXIS: 095
OS_CYLINDER: -1.25
OS_SPHERE: +0.75
OD_SPHERE: +1.00
OS_AXIS: 080
OD_SPHERE: +1.25
OD_AXIS: 100
OD_VA1: 20/20
OD_CYLINDER: -1.50
OD_VA1: 20/20-
OS_AXIS: 085
OS_CYLINDER: -1.25
OS_ADD: +2.50
OS_SPHERE: +1.00
OS_VA1: 20/20-
OD_CYLINDER: -1.00
OD_ADD: +2.50
OS_VA1: 20/25

## 2023-10-25 ASSESSMENT — REFRACTION_CURRENTRX
OS_CYLINDER: -1.25
OD_ADD: +2.25
OD_AXIS: 101
OS_SPHERE: +1.00
OS_ADD: +2.25
OS_OVR_VA: 20/
OD_CYLINDER: -1.00
OS_VPRISM_DIRECTION: PROGS
OS_ADD: +2.25
OD_VPRISM_DIRECTION: PROGS
OS_AXIS: 090
OD_VPRISM_DIRECTION: PROGS
OD_ADD: +2.25
OD_OVR_VA: 20/
OS_CYLINDER: -1.25
OS_SPHERE: +1.00
OD_SPHERE: +1.25
OD_CYLINDER: -1.50
OD_OVR_VA: 20/
OS_OVR_VA: 20/
OS_AXIS: 091
OS_VPRISM_DIRECTION: PROGS
OD_AXIS: 094
OD_SPHERE: +1.25

## 2023-10-25 ASSESSMENT — CONFRONTATIONAL VISUAL FIELD TEST (CVF)
OD_FINDINGS: FULL
OS_FINDINGS: FULL

## 2023-10-25 ASSESSMENT — KERATOMETRY
METHOD_AUTO_MANUAL: AUTO
OD_K1POWER_DIOPTERS: 44.50
OS_K1POWER_DIOPTERS: 45.75
OD_K2POWER_DIOPTERS: 45.25
OD_AXISANGLE_DEGREES: 009
OS_AXISANGLE_DEGREES: 161
OS_K2POWER_DIOPTERS: 46.00

## 2023-10-25 ASSESSMENT — AXIALLENGTH_DERIVED
OD_AL: 23.0045
OS_AL: 22.7055
OS_AL: 22.7055
OD_AL: 23.0045
OS_AL: 22.6156
OD_AL: 22.8207

## 2023-10-25 ASSESSMENT — REFRACTION_AUTOREFRACTION
OS_CYLINDER: -1.25
OD_CYLINDER: -1.50
OS_AXIS: 080
OS_SPHERE: +0.75
OD_SPHERE: +1.00
OD_AXIS: 094

## 2023-10-25 ASSESSMENT — PUNCTA - ASSESSMENT
OD_PUNCTA: SIL PLUG
OS_PUNCTA: SIL PLUG

## 2023-10-25 ASSESSMENT — SPHEQUIV_DERIVED
OS_SPHEQUIV: 0.125
OD_SPHEQUIV: 0.75
OS_SPHEQUIV: 0.375
OD_SPHEQUIV: 0.25
OD_SPHEQUIV: 0.25
OS_SPHEQUIV: 0.125

## 2023-10-25 ASSESSMENT — TONOMETRY
OS_IOP_MMHG: 15
OD_IOP_MMHG: 14

## 2023-10-25 ASSESSMENT — VISUAL ACUITY
OD_BCVA: 20/25-
OS_BCVA: 20/25

## 2023-10-25 ASSESSMENT — SUPERFICIAL PUNCTATE KERATITIS (SPK)
OD_SPK: 1+
OS_SPK: 1+

## 2023-12-11 ENCOUNTER — OFFICE (OUTPATIENT)
Dept: URBAN - METROPOLITAN AREA CLINIC 100 | Facility: CLINIC | Age: 78
Setting detail: OPHTHALMOLOGY
End: 2023-12-11
Payer: MEDICARE

## 2023-12-11 DIAGNOSIS — H35.3131: ICD-10-CM

## 2023-12-11 DIAGNOSIS — H35.373: ICD-10-CM

## 2023-12-11 DIAGNOSIS — H16.223: ICD-10-CM

## 2023-12-11 DIAGNOSIS — H43.393: ICD-10-CM

## 2023-12-11 DIAGNOSIS — H26.493: ICD-10-CM

## 2023-12-11 DIAGNOSIS — Z96.1: ICD-10-CM

## 2023-12-11 PROCEDURE — 99214 OFFICE O/P EST MOD 30 MIN: CPT | Performed by: OPHTHALMOLOGY

## 2023-12-11 ASSESSMENT — REFRACTION_MANIFEST
OD_CYLINDER: -1.75
OD_SPHERE: +1.25
OS_ADD: +2.50
OD_VA1: 20/20-
OD_AXIS: 095
OS_SPHERE: PLANO
OD_VA1: 20/20-
OS_VA1: 20/30-
OS_VA1: 20/20-
OD_CYLINDER: -1.75
OD_SPHERE: +1.25
OD_ADD: +2.50
OS_SPHERE: PLANO
OD_AXIS: 095
OS_CYLINDER: -0.75
OS_AXIS: 085
OS_CYLINDER: -0.75
OS_AXIS: 085

## 2023-12-11 ASSESSMENT — REFRACTION_CURRENTRX
OD_AXIS: 105
OD_ADD: +2.25
OS_VPRISM_DIRECTION: PROGS
OS_AXIS: 095
OS_SPHERE: +1.00
OD_CYLINDER: -1.00
OD_CYLINDER: -1.50
OD_VPRISM_DIRECTION: PROGS
OD_OVR_VA: 20/
OS_CYLINDER: -1.25
OS_ADD: +2.25
OS_SPHERE: +1.00
OS_CYLINDER: -1.25
OS_OVR_VA: 20/
OS_OVR_VA: 20/
OD_AXIS: 101
OD_OVR_VA: 20/
OD_SPHERE: +1.25
OS_VPRISM_DIRECTION: PROGS
OS_ADD: +2.25
OD_ADD: +2.25
OS_AXIS: 091
OD_SPHERE: +1.25
OD_VPRISM_DIRECTION: PROGS

## 2023-12-11 ASSESSMENT — SUPERFICIAL PUNCTATE KERATITIS (SPK)
OS_SPK: 1+
OD_SPK: 1+

## 2023-12-11 ASSESSMENT — SPHEQUIV_DERIVED
OD_SPHEQUIV: 0.375

## 2023-12-11 ASSESSMENT — PUNCTA - ASSESSMENT
OD_PUNCTA: SIL PLUG
OS_PUNCTA: SIL PLUG

## 2023-12-11 ASSESSMENT — REFRACTION_AUTOREFRACTION
OS_AXIS: 085
OD_AXIS: 095
OD_SPHERE: +1.25
OS_SPHERE: PLANO
OS_CYLINDER: -0.75
OD_CYLINDER: -1.75

## 2023-12-11 ASSESSMENT — CONFRONTATIONAL VISUAL FIELD TEST (CVF)
OD_FINDINGS: FULL
OS_FINDINGS: FULL

## 2023-12-15 ENCOUNTER — ASC (OUTPATIENT)
Dept: URBAN - METROPOLITAN AREA SURGERY 8 | Facility: SURGERY | Age: 78
Setting detail: OPHTHALMOLOGY
End: 2023-12-15
Payer: MEDICARE

## 2023-12-15 ENCOUNTER — RX ONLY (RX ONLY)
Age: 78
End: 2023-12-15

## 2023-12-15 DIAGNOSIS — H26.492: ICD-10-CM

## 2023-12-15 PROCEDURE — 66821 AFTER CATARACT LASER SURGERY: CPT | Mod: LT | Performed by: OPHTHALMOLOGY

## 2023-12-15 ASSESSMENT — REFRACTION_MANIFEST
OS_CYLINDER: -0.75
OS_VA1: 20/20-
OD_AXIS: 095
OD_VA1: 20/20-
OD_AXIS: 095
OD_SPHERE: +1.25
OD_SPHERE: +1.25
OD_VA1: 20/20-
OD_CYLINDER: -1.75
OS_SPHERE: PLANO
OS_SPHERE: PLANO
OD_CYLINDER: -1.75
OD_ADD: +2.50
OS_AXIS: 085
OS_VA1: 20/30-
OS_ADD: +2.50
OS_CYLINDER: -0.75
OS_AXIS: 085

## 2023-12-15 ASSESSMENT — REFRACTION_CURRENTRX
OD_VPRISM_DIRECTION: PROGS
OS_CYLINDER: -1.25
OD_ADD: +2.25
OS_SPHERE: +1.00
OD_OVR_VA: 20/
OD_AXIS: 101
OS_ADD: +2.25
OS_OVR_VA: 20/
OS_CYLINDER: -1.25
OS_AXIS: 095
OS_ADD: +2.25
OD_AXIS: 105
OD_VPRISM_DIRECTION: PROGS
OS_VPRISM_DIRECTION: PROGS
OS_AXIS: 091
OD_SPHERE: +1.25
OD_CYLINDER: -1.00
OD_SPHERE: +1.25
OS_SPHERE: +1.00
OS_OVR_VA: 20/
OD_CYLINDER: -1.50
OS_VPRISM_DIRECTION: PROGS
OD_ADD: +2.25
OD_OVR_VA: 20/

## 2023-12-15 ASSESSMENT — REFRACTION_AUTOREFRACTION
OD_AXIS: 095
OD_CYLINDER: -1.75
OS_SPHERE: PLANO
OS_CYLINDER: -0.75
OD_SPHERE: +1.25
OS_AXIS: 085

## 2023-12-15 ASSESSMENT — SUPERFICIAL PUNCTATE KERATITIS (SPK)
OS_SPK: 1+
OD_SPK: 1+

## 2023-12-15 ASSESSMENT — CONFRONTATIONAL VISUAL FIELD TEST (CVF)
OD_FINDINGS: FULL
OS_FINDINGS: FULL

## 2023-12-15 ASSESSMENT — SPHEQUIV_DERIVED
OD_SPHEQUIV: 0.375

## 2024-01-05 ENCOUNTER — OFFICE (OUTPATIENT)
Dept: URBAN - METROPOLITAN AREA CLINIC 12 | Facility: CLINIC | Age: 79
Setting detail: OPHTHALMOLOGY
End: 2024-01-05
Payer: MEDICARE

## 2024-01-05 DIAGNOSIS — H16.223: ICD-10-CM

## 2024-01-05 DIAGNOSIS — H26.493: ICD-10-CM

## 2024-01-05 PROBLEM — H52.7 REFRACTIVE ERROR: Status: ACTIVE | Noted: 2024-01-05

## 2024-01-05 PROCEDURE — 99024 POSTOP FOLLOW-UP VISIT: CPT | Performed by: OPTOMETRIST

## 2024-01-05 ASSESSMENT — CONFRONTATIONAL VISUAL FIELD TEST (CVF)
OS_FINDINGS: FULL
OD_FINDINGS: FULL

## 2024-01-05 ASSESSMENT — REFRACTION_CURRENTRX
OS_AXIS: 095
OS_ADD: +2.25
OD_SPHERE: +1.25
OD_CYLINDER: -1.00
OS_SPHERE: +1.00
OD_AXIS: 101
OD_VPRISM_DIRECTION: PROGS
OS_VPRISM_DIRECTION: PROGS
OS_OVR_VA: 20/
OD_AXIS: 105
OS_CYLINDER: -1.25
OS_CYLINDER: -1.25
OS_AXIS: 091
OD_ADD: +2.25
OS_ADD: +2.25
OD_OVR_VA: 20/
OD_ADD: +2.25
OS_VPRISM_DIRECTION: PROGS
OS_OVR_VA: 20/
OD_CYLINDER: -1.50
OD_VPRISM_DIRECTION: PROGS
OS_SPHERE: +1.00
OD_SPHERE: +1.25
OD_OVR_VA: 20/

## 2024-01-05 ASSESSMENT — REFRACTION_AUTOREFRACTION
OD_AXIS: 098
OS_CYLINDER: -1.00
OD_CYLINDER: -1.25
OD_SPHERE: +1.00
OS_SPHERE: +0.75
OS_AXIS: 083

## 2024-01-05 ASSESSMENT — REFRACTION_MANIFEST
OS_AXIS: 085
OD_CYLINDER: -1.75
OD_AXIS: 100
OD_ADD: +2.50
OD_AXIS: 095
OD_VA1: 20/20-
OS_ADD: +2.50
OD_CYLINDER: -1.25
OD_SPHERE: +0.75
OD_VA1: 20/20
OS_CYLINDER: -1.00
OS_CYLINDER: -0.75
OS_VA1: 20/30-
OD_ADD: +2.50
OS_AXIS: 085
OS_SPHERE: PLANO
OD_SPHERE: +1.25
OS_VA1: 20/20-
OS_SPHERE: +1.00
OS_ADD: +2.50

## 2024-01-05 ASSESSMENT — SPHEQUIV_DERIVED
OD_SPHEQUIV: 0.375
OS_SPHEQUIV: 0.5
OD_SPHEQUIV: 0.125
OS_SPHEQUIV: 0.25
OD_SPHEQUIV: 0.375

## 2024-01-05 ASSESSMENT — PUNCTA - ASSESSMENT
OS_PUNCTA: SIL PLUG
OD_PUNCTA: SIL PLUG

## 2024-01-05 ASSESSMENT — SUPERFICIAL PUNCTATE KERATITIS (SPK)
OS_SPK: 1+ 2+
OD_SPK: 1+

## 2024-01-16 PROBLEM — H43.811 POSTERIOR VITREOUS DETACHMENT; RIGHT EYE: Status: ACTIVE | Noted: 2024-01-16

## 2024-03-22 ENCOUNTER — OFFICE (OUTPATIENT)
Dept: URBAN - METROPOLITAN AREA CLINIC 12 | Facility: CLINIC | Age: 79
Setting detail: OPHTHALMOLOGY
End: 2024-03-22
Payer: MEDICARE

## 2024-03-22 DIAGNOSIS — H16.223: ICD-10-CM

## 2024-03-22 DIAGNOSIS — H26.493: ICD-10-CM

## 2024-03-22 DIAGNOSIS — H18.513: ICD-10-CM

## 2024-03-22 DIAGNOSIS — H43.393: ICD-10-CM

## 2024-03-22 DIAGNOSIS — H35.3131: ICD-10-CM

## 2024-03-22 DIAGNOSIS — H35.373: ICD-10-CM

## 2024-03-22 PROCEDURE — 92134 CPTRZ OPH DX IMG PST SGM RTA: CPT | Performed by: STUDENT IN AN ORGANIZED HEALTH CARE EDUCATION/TRAINING PROGRAM

## 2024-03-22 PROCEDURE — 99213 OFFICE O/P EST LOW 20 MIN: CPT | Performed by: STUDENT IN AN ORGANIZED HEALTH CARE EDUCATION/TRAINING PROGRAM

## 2024-03-22 ASSESSMENT — REFRACTION_MANIFEST
OD_CYLINDER: -1.75
OS_ADD: +2.50
OS_SPHERE: +1.00
OS_VA1: 20/25
OD_ADD: +2.50
OD_CYLINDER: -1.25
OD_SPHERE: +0.75
OD_VA1: 20/20
OS_AXIS: 090
OS_SPHERE: +1.00
OS_CYLINDER: -1.00
OD_AXIS: 100
OD_AXIS: 100
OS_CYLINDER: -1.75
OD_VA1: 20/20
OS_AXIS: 085
OS_VA1: 20/20-
OD_SPHERE: +1.00

## 2024-03-22 ASSESSMENT — REFRACTION_CURRENTRX
OD_ADD: +2.25
OS_CYLINDER: -1.25
OS_ADD: +2.25
OD_SPHERE: +1.25
OS_SPHERE: +1.00
OS_SPHERE: +1.00
OD_OVR_VA: 20/
OS_VPRISM_DIRECTION: PROGS
OD_CYLINDER: -1.00
OS_VPRISM_DIRECTION: PROGS
OD_ADD: +2.25
OD_OVR_VA: 20/
OD_AXIS: 101
OD_SPHERE: +1.25
OD_AXIS: 101
OD_VPRISM_DIRECTION: PROGS
OD_CYLINDER: -1.50
OD_VPRISM_DIRECTION: PROGS
OS_OVR_VA: 20/
OS_ADD: +2.25
OS_AXIS: 091
OS_AXIS: 89
OS_CYLINDER: -1.25
OS_OVR_VA: 20/

## 2024-03-22 ASSESSMENT — SPHEQUIV_DERIVED
OD_SPHEQUIV: 0.125
OS_SPHEQUIV: 0.5
OD_SPHEQUIV: 0.125
OS_SPHEQUIV: 0.125

## 2024-04-26 ENCOUNTER — OFFICE (OUTPATIENT)
Dept: URBAN - METROPOLITAN AREA CLINIC 12 | Facility: CLINIC | Age: 79
Setting detail: OPHTHALMOLOGY
End: 2024-04-26
Payer: MEDICARE

## 2024-04-26 DIAGNOSIS — H16.223: ICD-10-CM

## 2024-04-26 DIAGNOSIS — S05.02XA: ICD-10-CM

## 2024-04-26 DIAGNOSIS — T15.02XA: ICD-10-CM

## 2024-04-26 DIAGNOSIS — H18.513: ICD-10-CM

## 2024-04-26 PROCEDURE — 99213 OFFICE O/P EST LOW 20 MIN: CPT | Mod: 25 | Performed by: STUDENT IN AN ORGANIZED HEALTH CARE EDUCATION/TRAINING PROGRAM

## 2024-04-26 PROCEDURE — 65222 REMOVE FOREIGN BODY FROM EYE: CPT | Mod: LT | Performed by: STUDENT IN AN ORGANIZED HEALTH CARE EDUCATION/TRAINING PROGRAM

## 2024-04-26 PROCEDURE — 68761 CLOSE TEAR DUCT OPENING: CPT | Mod: 50 | Performed by: STUDENT IN AN ORGANIZED HEALTH CARE EDUCATION/TRAINING PROGRAM

## 2024-05-02 ENCOUNTER — OFFICE (OUTPATIENT)
Dept: URBAN - METROPOLITAN AREA CLINIC 12 | Facility: CLINIC | Age: 79
Setting detail: OPHTHALMOLOGY
End: 2024-05-02
Payer: MEDICARE

## 2024-05-02 DIAGNOSIS — H16.223: ICD-10-CM

## 2024-05-02 DIAGNOSIS — H18.513: ICD-10-CM

## 2024-05-02 PROBLEM — S05.02XA CORNEAL ABRASION; INITIAL ENCOUNTER  LEFT EYE: Status: RESOLVED | Noted: 2024-04-26 | Resolved: 2024-05-02

## 2024-05-02 PROCEDURE — 99024 POSTOP FOLLOW-UP VISIT: CPT | Performed by: STUDENT IN AN ORGANIZED HEALTH CARE EDUCATION/TRAINING PROGRAM

## 2024-05-02 ASSESSMENT — CONFRONTATIONAL VISUAL FIELD TEST (CVF)
OS_FINDINGS: FULL
OD_FINDINGS: FULL

## 2024-08-20 ENCOUNTER — OFFICE (OUTPATIENT)
Dept: URBAN - METROPOLITAN AREA CLINIC 12 | Facility: CLINIC | Age: 79
Setting detail: OPHTHALMOLOGY
End: 2024-08-20
Payer: MEDICARE

## 2024-08-20 DIAGNOSIS — H16.223: ICD-10-CM

## 2024-08-20 DIAGNOSIS — H18.513: ICD-10-CM

## 2024-08-20 PROBLEM — H16.221 DRY EYE SYNDROME K SICCA;  , RIGHT EYE: Status: ACTIVE | Noted: 2024-08-20

## 2024-08-20 PROCEDURE — 99213 OFFICE O/P EST LOW 20 MIN: CPT | Performed by: STUDENT IN AN ORGANIZED HEALTH CARE EDUCATION/TRAINING PROGRAM

## 2024-08-20 ASSESSMENT — CONFRONTATIONAL VISUAL FIELD TEST (CVF)
OD_FINDINGS: FULL
OS_FINDINGS: FULL

## 2024-10-21 ENCOUNTER — OFFICE (OUTPATIENT)
Dept: URBAN - METROPOLITAN AREA CLINIC 12 | Facility: CLINIC | Age: 79
Setting detail: OPHTHALMOLOGY
End: 2024-10-21
Payer: MEDICARE

## 2024-10-21 DIAGNOSIS — H16.223: ICD-10-CM

## 2024-10-21 DIAGNOSIS — H18.513: ICD-10-CM

## 2024-10-21 DIAGNOSIS — H43.393: ICD-10-CM

## 2024-10-21 DIAGNOSIS — H35.3131: ICD-10-CM

## 2024-10-21 DIAGNOSIS — H35.373: ICD-10-CM

## 2024-10-21 PROCEDURE — 92014 COMPRE OPH EXAM EST PT 1/>: CPT | Mod: 25 | Performed by: STUDENT IN AN ORGANIZED HEALTH CARE EDUCATION/TRAINING PROGRAM

## 2024-10-21 PROCEDURE — 68761 CLOSE TEAR DUCT OPENING: CPT | Mod: 50 | Performed by: STUDENT IN AN ORGANIZED HEALTH CARE EDUCATION/TRAINING PROGRAM

## 2024-10-21 PROCEDURE — 92250 FUNDUS PHOTOGRAPHY W/I&R: CPT | Performed by: STUDENT IN AN ORGANIZED HEALTH CARE EDUCATION/TRAINING PROGRAM

## 2024-10-21 ASSESSMENT — REFRACTION_MANIFEST
OD_VA1: 20/20
OS_VA1: 20/20-
OS_CYLINDER: -1.00
OS_ADD: +2.50
OS_CYLINDER: -1.75
OS_AXIS: 085
OS_SPHERE: +1.00
OD_AXIS: 100
OD_SPHERE: +1.00
OS_AXIS: 090
OS_VA1: 20/25
OD_ADD: +2.50
OD_SPHERE: +0.75
OD_AXIS: 100
OD_CYLINDER: -1.75
OS_SPHERE: +1.00
OD_VA1: 20/20
OD_CYLINDER: -1.25

## 2024-10-21 ASSESSMENT — REFRACTION_AUTOREFRACTION
OS_AXIS: 082
OS_CYLINDER: -1.50
OD_SPHERE: +1.50
OD_AXIS: 097
OD_CYLINDER: -1.75
OS_SPHERE: +1.25

## 2024-10-21 ASSESSMENT — PUNCTA - ASSESSMENT
OS_PUNCTA: COL PLUG
OD_PUNCTA: COL PLUG

## 2024-10-21 ASSESSMENT — REFRACTION_CURRENTRX
OS_CYLINDER: -1.25
OS_ADD: +2.25
OS_SPHERE: +0.75
OD_SPHERE: +1.25
OD_AXIS: 105
OS_VPRISM_DIRECTION: PROGS
OS_AXIS: 091
OD_AXIS: 101
OS_OVR_VA: 20/
OD_OVR_VA: 20/
OS_VPRISM_DIRECTION: PROGS
OD_VPRISM_DIRECTION: PROGS
OS_ADD: +2.50
OD_CYLINDER: -1.25
OS_CYLINDER: -1.25
OD_OVR_VA: 20/
OS_SPHERE: +1.00
OD_SPHERE: +1.00
OD_ADD: +2.50
OS_AXIS: 088
OS_OVR_VA: 20/
OD_ADD: +2.25
OD_VPRISM_DIRECTION: PROGS
OD_CYLINDER: -1.50

## 2024-10-21 ASSESSMENT — KERATOMETRY
OD_AXISANGLE_DEGREES: 009
OD_K2POWER_DIOPTERS: 45.00
OS_AXISANGLE_DEGREES: 153
METHOD_AUTO_MANUAL: AUTO
OS_K1POWER_DIOPTERS: 45.75
OS_K2POWER_DIOPTERS: 56.25
OD_K1POWER_DIOPTERS: 44.25

## 2024-10-21 ASSESSMENT — CORNEAL TRAUMA - ABRASION: OS_ABRASION: ABSENT

## 2024-10-21 ASSESSMENT — CONFRONTATIONAL VISUAL FIELD TEST (CVF)
OD_FINDINGS: FULL
OS_FINDINGS: FULL

## 2024-10-21 ASSESSMENT — VISUAL ACUITY
OD_BCVA: 20/30
OS_BCVA: 20/30+2

## 2024-10-21 ASSESSMENT — TONOMETRY
OS_IOP_MMHG: 16
OD_IOP_MMHG: 15

## 2024-10-21 ASSESSMENT — SUPERFICIAL PUNCTATE KERATITIS (SPK)
OD_SPK: T
OS_SPK: ABSENT

## 2024-11-25 ENCOUNTER — OFFICE (OUTPATIENT)
Dept: URBAN - METROPOLITAN AREA CLINIC 12 | Facility: CLINIC | Age: 79
Setting detail: OPHTHALMOLOGY
End: 2024-11-25
Payer: MEDICARE

## 2024-11-25 DIAGNOSIS — H16.223: ICD-10-CM

## 2024-11-25 PROCEDURE — DEFER/DELA DEFER/DELAY 30 DAY: Performed by: STUDENT IN AN ORGANIZED HEALTH CARE EDUCATION/TRAINING PROGRAM

## 2024-11-25 PROCEDURE — 99213 OFFICE O/P EST LOW 20 MIN: CPT | Performed by: STUDENT IN AN ORGANIZED HEALTH CARE EDUCATION/TRAINING PROGRAM

## 2024-11-25 ASSESSMENT — REFRACTION_MANIFEST
OS_CYLINDER: -1.00
OD_CYLINDER: -1.75
OD_VA1: 20/20
OS_CYLINDER: -1.75
OD_SPHERE: +0.75
OS_AXIS: 090
OS_ADD: +2.50
OS_SPHERE: +1.00
OS_AXIS: 085
OD_VA1: 20/20
OD_AXIS: 100
OS_SPHERE: +1.00
OS_VA1: 20/25
OD_AXIS: 100
OD_ADD: +2.50
OS_VA1: 20/20-
OD_CYLINDER: -1.25
OD_SPHERE: +1.00

## 2024-11-25 ASSESSMENT — REFRACTION_CURRENTRX
OD_AXIS: 105
OS_VPRISM_DIRECTION: PROGS
OS_CYLINDER: -1.25
OS_SPHERE: +1.00
OD_CYLINDER: -1.25
OS_OVR_VA: 20/
OD_ADD: +2.50
OS_OVR_VA: 20/
OS_AXIS: 088
OS_AXIS: 091
OS_ADD: +2.50
OD_OVR_VA: 20/
OD_SPHERE: +1.00
OD_VPRISM_DIRECTION: PROGS
OS_CYLINDER: -1.25
OD_AXIS: 101
OS_ADD: +2.25
OD_CYLINDER: -1.50
OD_VPRISM_DIRECTION: PROGS
OS_VPRISM_DIRECTION: PROGS
OD_ADD: +2.25
OD_SPHERE: +1.25
OS_SPHERE: +0.75
OD_OVR_VA: 20/

## 2024-11-25 ASSESSMENT — REFRACTION_AUTOREFRACTION
OD_SPHERE: +1.00
OD_CYLINDER: -1.50
OS_CYLINDER: -1.25
OS_AXIS: 084
OD_AXIS: 099
OS_SPHERE: +1.25

## 2024-11-25 ASSESSMENT — KERATOMETRY
OS_K1POWER_DIOPTERS: 45.75
METHOD_AUTO_MANUAL: AUTO
OS_AXISANGLE_DEGREES: 165
OS_K2POWER_DIOPTERS: 46.25
OD_K2POWER_DIOPTERS: 45.25
OD_AXISANGLE_DEGREES: 018
OD_K1POWER_DIOPTERS: 44.50

## 2024-11-25 ASSESSMENT — CORNEAL TRAUMA - ABRASION: OS_ABRASION: ABSENT

## 2024-11-25 ASSESSMENT — TONOMETRY
OS_IOP_MMHG: 14
OD_IOP_MMHG: 14

## 2024-11-25 ASSESSMENT — VISUAL ACUITY
OS_BCVA: 20/25-1
OD_BCVA: 20/25-1

## 2024-11-25 ASSESSMENT — PUNCTA - ASSESSMENT
OS_PUNCTA: COL PLUG
OD_PUNCTA: COL PLUG

## 2024-11-25 ASSESSMENT — CONFRONTATIONAL VISUAL FIELD TEST (CVF)
OD_FINDINGS: FULL
OS_FINDINGS: FULL

## 2024-11-25 ASSESSMENT — SUPERFICIAL PUNCTATE KERATITIS (SPK)
OD_SPK: T
OS_SPK: ABSENT

## 2025-02-28 PROBLEM — H52.03 HYPEROPIA ; BOTH EYES: Status: ACTIVE | Noted: 2025-02-28

## 2025-04-21 ENCOUNTER — OFFICE (OUTPATIENT)
Dept: URBAN - METROPOLITAN AREA CLINIC 12 | Facility: CLINIC | Age: 80
Setting detail: OPHTHALMOLOGY
End: 2025-04-21
Payer: MEDICARE

## 2025-04-21 ENCOUNTER — RX ONLY (RX ONLY)
Age: 80
End: 2025-04-21

## 2025-04-21 DIAGNOSIS — H43.393: ICD-10-CM

## 2025-04-21 DIAGNOSIS — H16.223: ICD-10-CM

## 2025-04-21 DIAGNOSIS — H18.513: ICD-10-CM

## 2025-04-21 DIAGNOSIS — H35.3131: ICD-10-CM

## 2025-04-21 DIAGNOSIS — H35.373: ICD-10-CM

## 2025-04-21 PROCEDURE — 92134 CPTRZ OPH DX IMG PST SGM RTA: CPT | Performed by: STUDENT IN AN ORGANIZED HEALTH CARE EDUCATION/TRAINING PROGRAM

## 2025-04-21 PROCEDURE — 92014 COMPRE OPH EXAM EST PT 1/>: CPT | Mod: 25 | Performed by: STUDENT IN AN ORGANIZED HEALTH CARE EDUCATION/TRAINING PROGRAM

## 2025-04-21 PROCEDURE — 68761 CLOSE TEAR DUCT OPENING: CPT | Mod: 50 | Performed by: STUDENT IN AN ORGANIZED HEALTH CARE EDUCATION/TRAINING PROGRAM

## 2025-04-21 ASSESSMENT — REFRACTION_MANIFEST
OS_CYLINDER: -1.75
OD_VA1: 20/20
OD_AXIS: 100
OS_VA1: 20/25
OD_VA1: 20/20
OD_CYLINDER: -1.75
OS_AXIS: 085
OD_CYLINDER: -1.25
OS_VA1: 20/20-
OD_AXIS: 100
OS_ADD: +2.50
OD_ADD: +2.50
OS_SPHERE: +1.00
OS_AXIS: 090
OS_SPHERE: +1.00
OS_CYLINDER: -1.00
OD_SPHERE: +0.75
OD_SPHERE: +1.00

## 2025-04-21 ASSESSMENT — KERATOMETRY
METHOD_AUTO_MANUAL: AUTO
OD_K2POWER_DIOPTERS: 45.00
OS_K2POWER_DIOPTERS: 46.25
OS_K1POWER_DIOPTERS: 45.75
OD_AXISANGLE_DEGREES: 090
OS_AXISANGLE_DEGREES: 168
OD_K1POWER_DIOPTERS: 45.00

## 2025-04-21 ASSESSMENT — REFRACTION_CURRENTRX
OS_VPRISM_DIRECTION: PROGS
OD_OVR_VA: 20/
OS_OVR_VA: 20/
OD_VPRISM_DIRECTION: PROGS
OS_SPHERE: +0.75
OD_VPRISM_DIRECTION: PROGS
OD_AXIS: 094
OS_CYLINDER: -1.25
OD_OVR_VA: 20/
OD_ADD: +2.25
OD_CYLINDER: -1.50
OS_ADD: +2.75
OS_AXIS: 071
OD_SPHERE: +1.25
OS_ADD: +2.25
OD_SPHERE: +1.00
OS_VPRISM_DIRECTION: PROGS
OS_SPHERE: +1.00
OD_ADD: +2.50
OS_AXIS: 091
OS_OVR_VA: 20/
OS_CYLINDER: -1.25
OD_CYLINDER: -1.25
OD_AXIS: 101

## 2025-04-21 ASSESSMENT — TONOMETRY
OS_IOP_MMHG: 14
OD_IOP_MMHG: 16

## 2025-04-21 ASSESSMENT — CORNEAL TRAUMA - ABRASION: OS_ABRASION: ABSENT

## 2025-04-21 ASSESSMENT — REFRACTION_AUTOREFRACTION
OS_CYLINDER: -1.00
OD_SPHERE: +0.75
OS_SPHERE: +0.75
OD_CYLINDER: -1.25
OD_AXIS: 095
OS_AXIS: 089

## 2025-04-21 ASSESSMENT — VISUAL ACUITY
OS_BCVA: 20/20
OD_BCVA: 20/25-2

## 2025-04-21 ASSESSMENT — CONFRONTATIONAL VISUAL FIELD TEST (CVF)
OS_FINDINGS: FULL
OD_FINDINGS: FULL

## 2025-04-21 ASSESSMENT — SUPERFICIAL PUNCTATE KERATITIS (SPK)
OD_SPK: T
OS_SPK: ABSENT

## 2025-04-28 ENCOUNTER — APPOINTMENT (OUTPATIENT)
Dept: PAIN MANAGEMENT | Facility: CLINIC | Age: 80
End: 2025-04-28
Payer: MEDICARE

## 2025-04-28 VITALS — WEIGHT: 146 LBS | HEIGHT: 62 IN | BODY MASS INDEX: 26.87 KG/M2

## 2025-04-28 PROCEDURE — 99204 OFFICE O/P NEW MOD 45 MIN: CPT

## 2025-04-28 NOTE — PHYSICAL EXAM
[de-identified] : Constitutional: - No acute distress - Well developed; well nourished   Neurological: - normal mood and affect - alert and oriented x 3   Cardiovascular: - grossly normal [Right] : right knee [] : mildly antalgic

## 2025-04-28 NOTE — ASSESSMENT
[FreeTextEntry1] : We discussed the nature of the underlying pathology and available pain management treatment options. These included interventional, rehabilitative, pharmacological, and complementary modalities. We will proceed with the following:    Interventional treatment options: - Proceed with right genicular nerve block with fluoroscopic guidance - Can reconsider repeat intra-articular CSI and/or viscosupplementation - see additional instructions below    Rehabilitative options: - Completed prior PT trials - Participation in active HEP was discussed and encouraged as tolerated   Medication based treatment options: - Continue Celebrex 200 mg daily on as-needed basis - See additional instructions below    Complementary treatment options: - Weight management and lifestyle modifications discussed   Additional treatment recommendations as follows: - Patient will consult with orthopedic surgery regarding definitive treatment options for advanced right knee OA; referral provided today - Follow up 1-2 weeks post injection for assessment of efficacy and further treatment recommendations  The risks, benefits and alternatives of the proposed procedure were explained in detail with the patient. The risks outlined include but are not limited to infection, bleeding, nerve injury, a temporary increase in pain, failure to resolve symptoms, need for future interventions, allergic reaction, and possible elevation of blood sugar in diabetics if using corticosteroid.  All questions were answered to patient's apparent satisfaction, and he/she verbalized an understanding.  We have discussed the risks, benefits, and alternatives for NSAID therapy including but not limited to the risk of bleeding, thrombosis, gastric mucosal irritation/ulceration, allergic reaction and kidney dysfunction.  The patient was counseled to utilize NSAIDs concurrently with food and/or a PPI if applicable.  They were counseled to use the lowest effective dose for the shortest duration. The patient verbalizes an understanding.  I, Racheal Briseno, acting as scribe, attest that this documentation has been prepared under the direction and in the presence of Provider Joesph Mallory DO.  The documentation recorded by the scribe, in my presence, accurately reflects the service I personally performed, and the decisions made by me with my edits as appropriate.

## 2025-04-28 NOTE — REASON FOR VISIT
[FreeTextEntry2] : Paul Jackman MD (Hem/Onc/Referring)\par Dionicio Jackman MD (PCP)\par Preet Garcia MD (Pul)  [FreeTextEntry3] : Aiden Maier MD, MPH \par System Director of Thoracic Surgery \par Director of [facility] [place] \par Professor Cardiovascular & Thoracic Surgery  \par [facility] [employer] [facility]\par  [Initial Consultation] : an initial pain management consultation [FreeTextEntry2] : bilateral knee pain

## 2025-04-28 NOTE — HISTORY OF PRESENT ILLNESS
[7] : 7 [4] : 4 [Dull/Aching] : dull/aching [Constant] : constant [Household chores] : household chores [Leisure] : leisure [Sleep] : sleep [Nothing helps with pain getting better] : Nothing helps with pain getting better [Standing] : standing [Walking] : walking [Stairs] : stairs [Retired] : Work status: retired [Gel One] : Gel One [] : Post Surgical Visit: no [FreeTextEntry1] : b/l knee [FreeTextEntry7] : to the toes, to thighs  [de-identified] : weather

## 2025-05-01 ENCOUNTER — APPOINTMENT (OUTPATIENT)
Dept: ORTHOPEDIC SURGERY | Facility: CLINIC | Age: 80
End: 2025-05-01
Payer: MEDICARE

## 2025-05-01 VITALS — WEIGHT: 146 LBS | HEIGHT: 62 IN | BODY MASS INDEX: 26.87 KG/M2

## 2025-05-01 DIAGNOSIS — M17.11 UNILATERAL PRIMARY OSTEOARTHRITIS, RIGHT KNEE: ICD-10-CM

## 2025-05-01 DIAGNOSIS — M17.12 UNILATERAL PRIMARY OSTEOARTHRITIS, LEFT KNEE: ICD-10-CM

## 2025-05-01 PROCEDURE — 99204 OFFICE O/P NEW MOD 45 MIN: CPT

## 2025-05-01 PROCEDURE — 73564 X-RAY EXAM KNEE 4 OR MORE: CPT | Mod: 50

## 2025-05-01 NOTE — HISTORY OF PRESENT ILLNESS
[7] : 7 [4] : 4 [Dull/Aching] : dull/aching [Intermittent] : intermittent [Household chores] : household chores [Social interactions] : social interactions [Meds] : meds [Sitting] : sitting [Stairs] : stairs [Retired] : Work status: retired [de-identified] : The patient presents for initial evaluation regarding her bilateral knee pain.  Patient reports she has had longstanding focal pain in bilateral knees. Her pain is exacerbated with climbing stairs. She takes Celebrex PRN for pain. Patient reports she has been evaluated by several orthopedists who told her that her knees are "bone-on-bone." She has gotten CSIs in bilateral knees with short-term benefit. She also had one gel injection in 2021 with mild relief.  She has a h/o of right femur fracture in 2021; underwent ORIF in [place] with good result. [] : no [FreeTextEntry1] : Bilateral knees  [FreeTextEntry3] : 10 years  [FreeTextEntry5] : No specific injury  [FreeTextEntry7] : Pain that can radiate up into her right hamstring  [FreeTextEntry9] : Celebrex [de-identified] : Siting to standing  [de-identified] :  2 years ago  [de-identified] : 2021 [de-identified] : 10-15years ago  [de-identified] : CSI

## 2025-05-01 NOTE — PHYSICAL EXAM
[Bilateral] : knee bilaterally [NL (0)] : extension 0 degrees [5___] : hamstring 5[unfilled]/5 [] : ligamentously stable [TWNoteComboBox7] : flexion 90 degrees

## 2025-05-01 NOTE — ASSESSMENT
[FreeTextEntry1] : 80F p/w adv apolonia knee OA, previous retro imn right femur  f/u Dr Ayala re genicular nerve block could consider repeat injections return 6 weeks  The natural progression of Osteoarthritis was explained to the patient. We discussed the possible treatment options from conservative to operative. These included NSAIDS, Glucosamine and Chondrotin sulfate, and Physical Therapy as well different types of injections. We also discussed that at some point they may progress to needed a TKA. Information and pamphlets were given.

## 2025-05-19 ENCOUNTER — APPOINTMENT (OUTPATIENT)
Dept: PAIN MANAGEMENT | Facility: CLINIC | Age: 80
End: 2025-05-19
Payer: MEDICARE

## 2025-05-19 VITALS — BODY MASS INDEX: 26.5 KG/M2 | WEIGHT: 144 LBS | HEIGHT: 62 IN

## 2025-05-19 DIAGNOSIS — M17.12 UNILATERAL PRIMARY OSTEOARTHRITIS, LEFT KNEE: ICD-10-CM

## 2025-05-19 DIAGNOSIS — M25.562 PAIN IN RIGHT KNEE: ICD-10-CM

## 2025-05-19 DIAGNOSIS — M17.11 UNILATERAL PRIMARY OSTEOARTHRITIS, RIGHT KNEE: ICD-10-CM

## 2025-05-19 DIAGNOSIS — G89.29 PAIN IN RIGHT KNEE: ICD-10-CM

## 2025-05-19 DIAGNOSIS — M25.561 PAIN IN RIGHT KNEE: ICD-10-CM

## 2025-05-19 PROCEDURE — 99213 OFFICE O/P EST LOW 20 MIN: CPT

## 2025-05-20 PROBLEM — M25.561 CHRONIC PAIN OF BOTH KNEES: Status: ACTIVE | Noted: 2025-05-20

## 2025-05-20 NOTE — HISTORY OF PRESENT ILLNESS
[10] : 10 [8] : 8 [Dull/Aching] : dull/aching [Radiating] : radiating [Intermittent] : intermittent [Household chores] : household chores [Leisure] : leisure [Meds] : meds [Walking] : walking [Stairs] : stairs [Gel One] : Gel One [] : Post Surgical Visit: no [FreeTextEntry1] : B/L KNEE [FreeTextEntry6] : Pinching  [FreeTextEntry7] : Back of knees [FreeTextEntry9] : Celebrex [de-identified] : X [facility] AT [facility]

## 2025-05-20 NOTE — PHYSICAL EXAM
[Right] : right knee [de-identified] : Constitutional: - No acute distress - Well developed; well nourished   Neurological: - normal mood and affect - alert and oriented x 3   Cardiovascular: - grossly normal [] : no calf tenderness

## 2025-05-20 NOTE — HISTORY OF PRESENT ILLNESS
[10] : 10 [8] : 8 [Dull/Aching] : dull/aching [Radiating] : radiating [Intermittent] : intermittent [Household chores] : household chores [Leisure] : leisure [Meds] : meds [Walking] : walking [Stairs] : stairs [Gel One] : Gel One [] : Post Surgical Visit: no [FreeTextEntry1] : B/L KNEE [FreeTextEntry6] : Pinching  [FreeTextEntry7] : Back of knees [FreeTextEntry9] : Celebrex [de-identified] : X [facility] AT [facility]

## 2025-05-20 NOTE — PHYSICAL EXAM
[Right] : right knee [de-identified] : Constitutional: - No acute distress - Well developed; well nourished   Neurological: - normal mood and affect - alert and oriented x 3   Cardiovascular: - grossly normal [] : no calf tenderness

## 2025-05-20 NOTE — HISTORY OF PRESENT ILLNESS
[10] : 10 [8] : 8 [Dull/Aching] : dull/aching [Radiating] : radiating [Intermittent] : intermittent [Household chores] : household chores [Leisure] : leisure [Meds] : meds [Walking] : walking [Stairs] : stairs [Gel One] : Gel One [] : Post Surgical Visit: no [FreeTextEntry1] : B/L KNEE [FreeTextEntry6] : Pinching  [FreeTextEntry7] : Back of knees [FreeTextEntry9] : Celebrex [de-identified] : X [facility] AT [facility]

## 2025-05-20 NOTE — ASSESSMENT
[FreeTextEntry1] : We discussed the nature of the underlying pathology and available pain management treatment options. These included interventional, rehabilitative, pharmacological, and complementary modalities. We will proceed with the following:    Interventional treatment options: - Proceed with right genicular nerve block with fluoroscopic guidance - Can reconsider repeat trial of intra-articular CSI and/or viscosupplementation - see additional instructions below    Rehabilitative options: - Completed prior PT trials - Participation in active HEP was discussed and encouraged as tolerated   Medication based treatment options: - Continue Celebrex 200 mg daily on as-needed basis - See additional instructions below    Complementary treatment options: - Weight management and lifestyle modifications discussed   Additional treatment recommendations as follows: - Patient will continue follow up with [facility] as directed - Follow up 1-2 weeks post injection for assessment of efficacy and further treatment recommendations  The risks, benefits and alternatives of the proposed procedure were explained in detail with the patient. The risks outlined include but are not limited to infection, bleeding, nerve injury, a temporary increase in pain, failure to resolve symptoms, need for future interventions, allergic reaction, and possible elevation of blood sugar in diabetics if using corticosteroid.  All questions were answered to patient's apparent satisfaction, and he/she verbalized an understanding.  We have discussed the risks, benefits, and alternatives for NSAID therapy including but not limited to the risk of bleeding, thrombosis, gastric mucosal irritation/ulceration, allergic reaction and kidney dysfunction.  The patient was counseled to utilize NSAIDs concurrently with food and/or a PPI if applicable.  They were counseled to use the lowest effective dose for the shortest duration. The patient verbalizes an understanding.  ITiffanie NP, acting as scribe, attest that this documentation has been prepared under the direction and in the presence of Provider Joesph Mallory DO.    The documentation recorded by the scribe, in my presence, accurately reflects the service I personally performed, and the decisions made by me with my edits as appropriate.

## 2025-05-20 NOTE — PHYSICAL EXAM
[Right] : right knee [de-identified] : Constitutional: - No acute distress - Well developed; well nourished   Neurological: - normal mood and affect - alert and oriented x 3   Cardiovascular: - grossly normal [] : no calf tenderness

## 2025-05-27 ENCOUNTER — APPOINTMENT (OUTPATIENT)
Dept: ORTHOPEDIC SURGERY | Facility: AMBULATORY SURGERY CENTER | Age: 80
End: 2025-05-27
Payer: MEDICARE

## 2025-05-27 PROCEDURE — 64454 NJX AA&/STRD GNCLR NRV BRNCH: CPT | Mod: RT

## 2025-06-13 NOTE — PHYSICAL EXAM
[de-identified] : Constitutional: - No acute distress - Well developed; well nourished   Neurological: - normal mood and affect - alert and oriented x 3   Cardiovascular: - grossly normal [Right] : right knee [] : mildly antalgic

## 2025-06-13 NOTE — HISTORY OF PRESENT ILLNESS
[FreeTextEntry1] : 6/16/2025 - Patient presents for FUV after a right genicular nerve block on 5/27/2025. Patient reports  5/19/2025 - Patient presents for 3-week FUV. Patient reports her bilateral knee pain (R>>L) remains stable and unchanged.  Patient reports being seen and evaluated by orthopedic, Dr. Slaughter, who was in agreement to proceed with a genicular nerve block at this time. Continues to take Celebrex PRN for pain.   Denies any alarm signs, imbalance or changes in medical history since last office visit.  Presents today to discuss scheduling of the genicular nerve block procedure with the hope of decreasing her pain and increasing her functionality and quality of life.   4/28/2025 - The patient presents for initial evaluation regarding her bilateral knee pain.  Patient reports she has had longstanding focal pain in bilateral knees. Her pain is exacerbated with climbing stairs. She takes Celebrex PRN for pain. Patient reports she has been evaluated by several orthopedists who told her that her knees are "bone-on-bone." She has gotten CSIs and gel injections in bilateral knees with short-term benefit. Of note, she had a right femur fracture in 2021; underwent ORIF with good result.   Injections: 1) Right genicular nerve block (5/27/2025)   Pertinent Surgical History: N/A   Imaging: N/A   Physician Disclaimer: I have personally reviewed and confirmed all HPI data with the patient.

## 2025-06-16 ENCOUNTER — APPOINTMENT (OUTPATIENT)
Dept: PAIN MANAGEMENT | Facility: CLINIC | Age: 80
End: 2025-06-16